# Patient Record
Sex: FEMALE | Race: BLACK OR AFRICAN AMERICAN | NOT HISPANIC OR LATINO | Employment: OTHER | URBAN - METROPOLITAN AREA
[De-identification: names, ages, dates, MRNs, and addresses within clinical notes are randomized per-mention and may not be internally consistent; named-entity substitution may affect disease eponyms.]

---

## 2019-09-19 ENCOUNTER — COMMUNICATION - HEALTHEAST (OUTPATIENT)
Dept: SCHEDULING | Facility: CLINIC | Age: 29
End: 2019-09-19

## 2019-10-02 ENCOUNTER — OFFICE VISIT - HEALTHEAST (OUTPATIENT)
Dept: FAMILY MEDICINE | Facility: CLINIC | Age: 29
End: 2019-10-02

## 2019-10-02 DIAGNOSIS — N92.6 MISSED PERIOD: ICD-10-CM

## 2019-10-02 DIAGNOSIS — Z87.19 HISTORY OF DIVERTICULITIS: ICD-10-CM

## 2019-10-02 LAB
HCG SERPL-ACNC: <2 MLU/ML (ref 0–4)
HCG UR QL: NEGATIVE

## 2019-10-02 ASSESSMENT — MIFFLIN-ST. JEOR: SCORE: 1560.24

## 2019-10-02 NOTE — ASSESSMENT & PLAN NOTE
Amenorrhea.  This patient has 4 successful pregnancies.  Negative qualitative hCG level in clinic today.  Interestingly, the same time of this missed.  She had an episode of diverticulitis.  Ultrasound and CT scans done in early September did not show any pelvic abnormalities.  She was successfully treated for diverticulitis with complete resolution of her abdominal symptoms.  - Quant hCG.  Question missed AB.  -Return to clinic in 2-4 weeks if she has not had a period.  At that time, expand differential to include other possible etiologies.

## 2019-10-02 NOTE — ASSESSMENT & PLAN NOTE
One lifetime episode.  Successful treatment with Augmentin.  She has never had a colonoscopy.  I have recommended a 6-week follow-up to evaluate for colon cancer given the unusual age and presentation of her diverticulitis flare.

## 2019-10-03 ENCOUNTER — COMMUNICATION - HEALTHEAST (OUTPATIENT)
Dept: FAMILY MEDICINE | Facility: CLINIC | Age: 29
End: 2019-10-03

## 2019-10-04 ENCOUNTER — COMMUNICATION - HEALTHEAST (OUTPATIENT)
Dept: FAMILY MEDICINE | Facility: CLINIC | Age: 29
End: 2019-10-04

## 2021-06-01 NOTE — PROGRESS NOTES
Assessment/Plan:    Missed period  Amenorrhea.  This patient has 4 successful pregnancies.  Negative qualitative hCG level in clinic today.  Interestingly, the same time of this missed.  She had an episode of diverticulitis.  Ultrasound and CT scans done in early September did not show any pelvic abnormalities.  She was successfully treated for diverticulitis with complete resolution of her abdominal symptoms.  - Quant hCG.  Question missed AB.  -Return to clinic in 2-4 weeks if she has not had a period.  At that time, expand differential to include other possible etiologies.    History of diverticulitis  One lifetime episode.  Successful treatment with Augmentin.  She has never had a colonoscopy.  I have recommended a 6-week follow-up to evaluate for colon cancer given the unusual age and presentation of her diverticulitis flare.     Return in about 2 weeks (around 10/16/2019).    Kurtis Rich MD  _______________________________    Chief Complaint   Patient presents with     Amenorrhea     Subjective: Nj Capellan is a 29 y.o. year old female who I have not seen in clinic before who presents with the following acute complaint(s):    Amenorrhea:   -This patient's recent health history includes an episode of diverticulitis.  This was treated with Augmentin approximately 1 month ago.  She also had bacterial vaginosis.  She was evaluated for urinary tract infection a proximal me 4 weeks ago which was negative.     - No pain today   - periods have always been very regular   - otherwise, she feels okay.     - appetite has been low.   - no vaginal discharge.   - BM: no history of constipation   - 4 children (youngest is 3 years old)    ROS: Complete review of systems obtained.  Pertinent items are listed above.     The following portions of the patient's history were reviewed and updated as appropriate: allergies, current medications, past family history, past medical history, past social history, past surgical  "history and problem list.     Objective:   /64 (Patient Site: Left Arm, Patient Position: Sitting, Cuff Size: Adult Large)   Pulse 68   Temp 97.8  F (36.6  C) (Oral)   Resp 18   Ht 5' 4.5\" (1.638 m)   Wt 189 lb (85.7 kg)   LMP 08/05/2019   SpO2 98% Comment: room air  Breastfeeding? No   BMI 31.94 kg/m    General: No acute distress  Cardiac: Regular rate and rhythm, normal S1/S2, no murmurs of the gallops  Respiratory: Clear to auscultation bilaterally.  Abdomen: Soft, nontender, nondistended.  : Not completed.    Recent Results (from the past 24 hour(s))   Pregnancy, Urine   Result Value Ref Range    Pregnancy Test, Urine Negative Negative     Us Pelvis With Transvaginal Non Ob    Result Date: 9/6/2019  EXAM: US PELVIS WITH TRANSVAGINAL NON OB LOCATION: [facility] DATE/TIME: 9/6/2019 10:56 AM INDICATION: left side pelvic pain COMPARISON: None. TECHNIQUE: Transabdominal scans were performed. Endovaginal ultrasound was performed to better visualize the adnexa. FINDINGS: Uterus measures 5.4 x 4.9 x 3.2  cm. Normal uterus with no masses. Endometrial thickness is 4  mm. Normal smooth endometrium.  Right ovary measures 2.7 x 1.8 x 1.7  cm. Normal right ovary. Normal arterial duplex. Left ovary measures 3.0 x 2.4 x 1.5  cm. Normal left ovary. Normal arterial duplex. There is trace free fluid in the cul-de-sac which may be physiologic.     CONCLUSION: 1.  Normal ultrasound of the pelvis.    Ct Abdomen Pelvis Without Oral With Iv Contrast    Result Date: 9/6/2019  EXAM: CT ABDOMEN PELVIS WO ORAL W IV CONTRAST LOCATION: [facility] DATE/TIME: 9/6/2019 1:54 PM INDICATION: LLQ abdominal pain, diverticulitis suspected ABD PAIN COMPARISON: None. TECHNIQUE: Helical enhanced thin-section CT scan of the abdomen and pelvis was performed following injection of IV contrast. Multiplanar reformats were obtained. Dose reduction techniques were used. CONTRAST: Iohexol (Omni) 100 mL FINDINGS: LUNG BASES: " Negative. ABDOMEN: Liver, gallbladder, bile ducts, pancreas, spleen, adrenals and kidneys appear normal. PELVIS: Focal colonic wall thickening and pericolonic soft tissue stranding involving sigmoid colon. Several diverticula are present at the same region and the findings are consistent with uncomplicated diverticulitis. There is no abscess, free perforation or bowel obstruction. No adnexal lesions or free fluid. Appendix normal. MUSCULOSKELETAL: Negative.     CONCLUSION: 1.  Uncomplicated sigmoid diverticulitis.       Additional History from Old Records Summarized (2): yes  Decision to Obtain Records (1): no  Radiology Tests Summarized or Ordered (1): yes  Labs Reviewed or Ordered (1): yes  Medicine Test Summarized or Ordered (1): no  Independent Review of EKG or X-RAY(2 each): no    This note has been dictated using voice recognition software. Any grammatical or context distortions are unintentional and inherent to the software

## 2021-06-01 NOTE — TELEPHONE ENCOUNTER
Seen in  ER 2 weeks ago for abdominal pain. Given antibiotic x 10 days. Finished. The abdominal pain is gone. She states she still has not gotten her period. For the last 3 days she has had vaginal itching and discharge. LMP 8/10/2019.    Reason for Disposition    Menstrual period, missed or late    [1] Symptoms of a yeast infection (i.e., itchy, white discharge, not bad smelling) AND    [2] not improved > 3 days following CARE ADVICE    Protocols used: MENSTRUAL PERIOD - MISSED OR LATE-A-AH, VAGINAL SYMPTOMS-A-AH    (treated with antibiotic for diverticulitis).   Triaged to a disposition of see PCP when office is open-within 3 days. She will call her clinic in the am.     Lata Ramesh RN [facility] Triage Nurse

## 2021-06-02 NOTE — TELEPHONE ENCOUNTER
Patient Returning Call  Reason for call:  Test Results  Information relayed to patient:  Yes, patient said it is ok.   Patient has additional questions:  No  If YES, what are your questions/concerns:    Okay to leave a detailed message?: No call back needed

## 2021-06-02 NOTE — TELEPHONE ENCOUNTER
Test Results  Who is calling?:  Patient   Who ordered the test:  Kurtis Mills   Type of test: Lab  Date of test:  10/02/19  Where was the test performed:  [facility]   What are your questions/concerns?:  Patient calling In for results writer did relayed message from Dr.St Hubbard below. Patient have questions about why she did not not get her menstrual periods for two months she is requesting provider if he can recommend ultra sound .   Okay to leave a detailed message?:  No    Blood testing for pregnancy was negative.  Please call with questions or contact us using [employer].  Sincerely,    Electronically signed by Kurtis Rich MD

## 2021-06-02 NOTE — TELEPHONE ENCOUNTER
Left message to call back for: update  Information to relay to patient: upon return call please see questions below and update

## 2021-06-03 VITALS
DIASTOLIC BLOOD PRESSURE: 64 MMHG | HEART RATE: 68 BPM | BODY MASS INDEX: 31.49 KG/M2 | TEMPERATURE: 97.8 F | RESPIRATION RATE: 18 BRPM | OXYGEN SATURATION: 98 % | SYSTOLIC BLOOD PRESSURE: 120 MMHG | WEIGHT: 189 LBS | HEIGHT: 65 IN

## 2021-06-16 PROBLEM — Z87.19 HISTORY OF DIVERTICULITIS: Status: ACTIVE | Noted: 2019-10-02

## 2021-06-16 PROBLEM — N92.6 MISSED PERIOD: Status: ACTIVE | Noted: 2019-10-02

## 2021-06-19 NOTE — LETTER
Letter by Kurtis Rich MD at      Author: Kurtis Rich MD Service: -- Author Type: --    Filed:  Encounter Date: 10/3/2019 Status: Signed         Nj Capellan  [address] [facility] [address]             October 3, 2019         Dear Ms. Capelaln,    Below are the results from your recent visit:    Resulted Orders   Pregnancy, Urine   Result Value Ref Range    Pregnancy Test, Urine Negative Negative    Narrative    This test is for screening purposes. Results should be interpreted along with the clinical picture. Confirmation testing is available if warranted by ordering Test 143, Beta HCG, Quantitative.   Beta-hCG, Quantitative   Result Value Ref Range    Beta-hCG, Quantitative <2 0 - 4 mlU/mL    Narrative    Non-pregnant female . . . . . . . . . . . . . 0-4 (<5) mIU/mL  Equivocal result for early pregnancy . . . . 5-24 mIU/mL  Pregnant Female:  -------------------------------------------------------------  Weeks Post LMP Approximate HCG range(mIU/mL)  (Last Menstrual Period)range  -------------------------------------------------------------  3-4 Weeks . . . . . . . 9- 130 mIU/mL  4-5 Weeks . . . . . . . 75- 2,600 mIU/mL  5-6 Weeks . . . . . . 850- 20,800 mIU/mL  6-7 Weeks . . . . . 4,000-100,200 mIU/mL  7-12 Weeks . . . . . 11,500-289,000 mIU/mL  12-16 Weeks . . . . . 18,300-137,000 mIU/mL  16-29 Weeks. . . . . . 1,400- 53,000 mIU/mL  (2nd Trimester)    29-41 Weeks. . . . . . . 940- 60,000 mIU/mL    (3rd Trimester)  INTERPRETIVE NOTE:  For diagnostic purposes, hCG results should be used  conjunction with other data.  If the hCG level is inconsistent with clinical evidence,  results should be confirmed by an alternate hCG method.  This assay is approved for use in the early detection  of pregnancy only. It is not approved for any other  uses such as tumor marker screening or monitoring.  Beta HCG ranges were updated 2/2007 to reflect  methodology referencing to the 4th World Health  Organization  (WHO) International Standard.       Blood testing for pregnancy was negative.    Please call with questions or contact us using [employer].    Sincerely,        Electronically signed by Kurtis Rich MD

## 2021-07-19 ENCOUNTER — TRANSCRIBE ORDERS (OUTPATIENT)
Dept: MATERNAL FETAL MEDICINE | Facility: HOSPITAL | Age: 31
End: 2021-07-19

## 2021-07-19 ENCOUNTER — MEDICAL CORRESPONDENCE (OUTPATIENT)
Dept: HEALTH INFORMATION MANAGEMENT | Facility: CLINIC | Age: 31
End: 2021-07-19

## 2021-07-19 ENCOUNTER — TRANSFERRED RECORDS (OUTPATIENT)
Dept: HEALTH INFORMATION MANAGEMENT | Facility: CLINIC | Age: 31
End: 2021-07-19

## 2021-07-19 DIAGNOSIS — O26.90 PREGNANCY RELATED CONDITION, ANTEPARTUM: Primary | ICD-10-CM

## 2021-07-20 LAB
HEPATITIS B SURFACE ANTIGEN (EXTERNAL): POSITIVE
HEPATITIS C ANTIBODY (EXTERNAL): NONREACTIVE

## 2021-08-02 DIAGNOSIS — B18.1 HEPATITIS B CARRIER (H): Primary | ICD-10-CM

## 2021-08-24 ENCOUNTER — TELEPHONE (OUTPATIENT)
Dept: MATERNAL FETAL MEDICINE | Facility: CLINIC | Age: 31
End: 2021-08-24

## 2021-08-24 NOTE — TELEPHONE ENCOUNTER
Pt's listed number was out of service and multiple attempts were made to contact pt to make appt. I called the primary OB clinic and got another number to try, which is now updated in her demographics and listed as the primary contact number. I tried calling this new number twice, both times she answered but then hung up when I asked to speak with Nj. Unable to leave a message or get a hold of pt as of 8/24 since pt won't answer or let it go to voicemail. Primary clinic is aware that [facility] isn't able to reach pt at this new number, they will try calling the pt.    Aleshia Black   , [facility]  8/24/21

## 2021-08-26 ENCOUNTER — PRE VISIT (OUTPATIENT)
Dept: MATERNAL FETAL MEDICINE | Facility: CLINIC | Age: 31
End: 2021-08-26

## 2021-08-27 ENCOUNTER — OFFICE VISIT (OUTPATIENT)
Dept: MATERNAL FETAL MEDICINE | Facility: CLINIC | Age: 31
End: 2021-08-27
Attending: OBSTETRICS & GYNECOLOGY
Payer: COMMERCIAL

## 2021-08-27 ENCOUNTER — HOSPITAL ENCOUNTER (OUTPATIENT)
Dept: ULTRASOUND IMAGING | Facility: CLINIC | Age: 31
End: 2021-08-27
Attending: OBSTETRICS & GYNECOLOGY
Payer: COMMERCIAL

## 2021-08-27 DIAGNOSIS — O26.90 PREGNANCY RELATED CONDITION, ANTEPARTUM: ICD-10-CM

## 2021-08-27 DIAGNOSIS — B18.1 HEPATITIS B CARRIER (H): ICD-10-CM

## 2021-08-27 DIAGNOSIS — B19.10 HEPATITIS B COMPLICATING PREGNANCY: Primary | ICD-10-CM

## 2021-08-27 DIAGNOSIS — O98.419 HEPATITIS B COMPLICATING PREGNANCY: Primary | ICD-10-CM

## 2021-08-27 PROCEDURE — 76811 OB US DETAILED SNGL FETUS: CPT

## 2021-08-27 PROCEDURE — 76811 OB US DETAILED SNGL FETUS: CPT | Mod: 26 | Performed by: OBSTETRICS & GYNECOLOGY

## 2021-08-27 PROCEDURE — 99242 OFF/OP CONSLTJ NEW/EST SF 20: CPT | Mod: 25 | Performed by: OBSTETRICS & GYNECOLOGY

## 2021-08-27 NOTE — NURSING NOTE
Nj here for L2 US and MFM consult for pregnancy c/b Hep B carrier and obesity. Meds/allergies reviewed. Dr. Douglass reviewed US and met with pt (see consult note for recommendations).   Kayla Estrada RN

## 2021-08-30 NOTE — PROGRESS NOTES
"Please see \"Imaging\" tab under \"Chart Review\" for details of today's US.    Tonya Douglass, DO    "

## 2021-09-14 LAB
HAV AB SER QL IA: REACTIVE
HEMOGLOBIN (EXTERNAL): 12.9 G/DL (ref 11.7–15.5)
PLATELET COUNT (EXTERNAL): 229 10E3/UL (ref 140–400)
TREPONEMA PALLIDUM ANTIBODY (EXTERNAL): NONREACTIVE

## 2021-10-20 LAB — HIV1+2 AB SERPL QL IA: NONREACTIVE

## 2021-10-30 ENCOUNTER — HOSPITAL ENCOUNTER (OUTPATIENT)
Facility: CLINIC | Age: 31
End: 2021-10-30
Admitting: ADVANCED PRACTICE MIDWIFE
Payer: COMMERCIAL

## 2021-10-30 ENCOUNTER — HOSPITAL ENCOUNTER (EMERGENCY)
Facility: CLINIC | Age: 31
Discharge: HOME | End: 2021-10-30
Attending: EMERGENCY MEDICINE | Admitting: EMERGENCY MEDICINE
Payer: COMMERCIAL

## 2021-10-30 ENCOUNTER — HOSPITAL ENCOUNTER (OUTPATIENT)
Facility: CLINIC | Age: 31
Discharge: HOME | End: 2021-10-30
Attending: ADVANCED PRACTICE MIDWIFE | Admitting: ADVANCED PRACTICE MIDWIFE
Payer: COMMERCIAL

## 2021-10-30 VITALS
WEIGHT: 208.78 LBS | OXYGEN SATURATION: 97 % | HEART RATE: 94 BPM | RESPIRATION RATE: 24 BRPM | DIASTOLIC BLOOD PRESSURE: 60 MMHG | SYSTOLIC BLOOD PRESSURE: 115 MMHG | BODY MASS INDEX: 35.28 KG/M2

## 2021-10-30 VITALS — HEIGHT: 68 IN | BODY MASS INDEX: 31.37 KG/M2 | WEIGHT: 207 LBS
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
## 2021-10-30 DIAGNOSIS — S70.01XA CONTUSION OF RIGHT HIP, INITIAL ENCOUNTER: ICD-10-CM

## 2021-10-30 PROBLEM — Z36.89 ENCOUNTER FOR TRIAGE IN PREGNANT PATIENT: Status: ACTIVE | Noted: 2021-10-30

## 2021-10-30 LAB
ATRIAL RATE - MUSE: 102 BPM
DIASTOLIC BLOOD PRESSURE - MUSE: NORMAL MMHG
INTERPRETATION ECG - MUSE: NORMAL
P AXIS - MUSE: 52 DEGREES
PR INTERVAL - MUSE: 116 MS
QRS DURATION - MUSE: 70 MS
QT - MUSE: 338 MS
QTC - MUSE: 440 MS
R AXIS - MUSE: -18 DEGREES
SYSTOLIC BLOOD PRESSURE - MUSE: NORMAL MMHG
T AXIS - MUSE: 29 DEGREES
VENTRICULAR RATE- MUSE: 102 BPM

## 2021-10-30 PROCEDURE — G0463 HOSPITAL OUTPT CLINIC VISIT: HCPCS

## 2021-10-30 PROCEDURE — 99283 EMERGENCY DEPT VISIT LOW MDM: CPT

## 2021-10-30 PROCEDURE — 93005 ELECTROCARDIOGRAM TRACING: CPT | Performed by: EMERGENCY MEDICINE

## 2021-10-30 RX ORDER — LIDOCAINE 40 MG/G
CREAM TOPICAL
Status: DISCONTINUED | OUTPATIENT
Start: 2021-10-30 | End: 2021-10-30 | Stop reason: HOSPADM

## 2021-10-30 ASSESSMENT — MIFFLIN-ST. JEOR: SCORE: 1697.45

## 2021-10-30 NOTE — DISCHARGE INSTRUCTIONS
Discharge Instruction for Undelivered Patients      You were seen for: Fetal Assessment  We Consulted: [facility]  You had (Test or Medicine):Non Stress Test     Diet:   Drink 8 to 12 glasses of liquids (milk, juice, water) every day.  You may eat meals and snacks.     Activity:  Count fetal kicks everyday (see handout)  Call your doctor or nurse midwife if your baby is moving less than usual.     Call your provider if you notice:  Swelling in your face or increased swelling in your hands or legs.  Headaches that are not relieved by Tylenol (acetaminophen).  Changes in your vision (blurring: seeing spots or stars.)  Nausea (sick to your stomach) and vomiting (throwing up).   Weight gain of 5 pounds or more per week.  Heartburn that doesn't go away.  Signs of bladder infection: pain when you urinate (use the toilet), need to go more often and more urgently.  The bag of vides (rupture of membranes) breaks, or you notice leaking in your underwear.  Bright red blood in your underwear.  Abdominal (lower belly) or stomach pain.  For first baby: Contractions (tightening) less than 5 minutes apart for one hour or more.  Second (plus) baby: Contractions (tightening) less than 10 minutes apart and getting stronger.  *If less than 34 weeks: Contractions (tightening) more than 6 times in one hour.  Increase or change in vaginal discharge (note the color and amount)      Follow-up:  As scheduled in the clinic

## 2021-10-30 NOTE — ED PROVIDER NOTES
EMERGENCY DEPARTMENT ENCOUnter      NAME: Nj Capellan  AGE: 31 year old female  YOB: 1990  MRN: [id number]  EVALUATION DATE & TIME: 10/30/2021 12:07 PM    PCP: Norma Orantes    ED PROVIDER: Malick Sandoval DO      Chief Complaint   Patient presents with     Fall     Hip Pain     Tachycardia         FINAL IMPRESSION:  1. Fall, initial encounter    2. Contusion of right hip, initial encounter          ED COURSE & MEDICAL DECISION MAKIN:49 PM I met with the patient to gather history and to perform my initial exam. I discussed the plan for care while in the Emergency Department. PPE (gown, gloves, glasses, surgical cap, N95 mask, surgical mask, and face shield) was worn during patient encounters.       The patient presented to the emergency department today after slipping and falling at the store.  She fell onto her right hip.  She is currently 8 months pregnant.  She denies any specific pain and has no physical exam findings to suggest a serious injury.  We discussed performing imaging of her right hip but she would like to hold off on this at this time.  She did not strike her head and I do not feel that any other imaging would be warranted.  The case was discussed with maternity.  They recommend transferring her upstairs so that they can monitor her further given her late stage pregnancy.  She is comfortable with this plan.    At the conclusion of the encounter I discussed the results of all of the tests and the disposition. The questions were answered. The patient or family acknowledged understanding and was agreeable with the care plan.            =================================================================    HPI    Patient information was obtained from: the patient      Use of Interpretor: N/A      Nj Capellan is a 31 year old female with a pertinent history of diverticulitis and missed period who presents to this ED via walk-in unaccompanied for evaluation of a fall, hip, and  tachycardia.     The patient who is 8 months and 1 week pregnant reports slipping and falling on a wet surface while shopping at [employer] yesterday (10/29). She did not hit her head, but is now experiencing intermittent right hip pain as well as intermittent tachycardia. In addition, she mentioned that she can feel her baby moving more than before. The patient denies any other symptoms or complaints at this time.        REVIEW OF SYSTEMS     Constitutional:  Denies fever or chills  HENT:  Denies sore throat   Respiratory:  Denies cough or shortness of breath   Cardiovascular:  Denies chest pain or palpitations  GI:  Denies abdominal pain, nausea, or vomiting  Musculoskeletal:  Positive for right hip pain.   Skin:  Denies rash   Neurologic:  Denies headache, focal weakness or sensory changes    All other systems reviewed and are negative      PAST MEDICAL HISTORY:  History reviewed. No pertinent past medical history.    PAST SURGICAL HISTORY:  Past Surgical History:   Procedure Laterality Date      SECTION             CURRENT MEDICATIONS:    Prenatal MV-Min-Fe Fum-FA-DHA (PRENATAL 1 PO)        ALLERGIES:  No Known Allergies    FAMILY HISTORY:  Family History   Problem Relation Age of Onset     No Known Problems Daughter        SOCIAL HISTORY:   Social History     Socioeconomic History     Marital status:      Spouse name: None     Number of children: None     Years of education: None     Highest education level: None   Occupational History     None   Tobacco Use     Smoking status: Never Smoker     Smokeless tobacco: Never Used   Substance and Sexual Activity     Alcohol use: Never     Drug use: Never     Sexual activity: None   Other Topics Concern     None   Social History Narrative     None     Social Determinants of Health     Financial Resource Strain:      Difficulty of Paying Living Expenses:    Food Insecurity:      Worried About Running Out of Food in the Last Year:      Ran Out of Food in  the Last Year:    Transportation Needs:      Lack of Transportation (Medical):      Lack of Transportation (Non-Medical):    Physical Activity:      Days of Exercise per Week:      Minutes of Exercise per Session:    Stress:      Feeling of Stress :    Social Connections:      Frequency of Communication with Friends and Family:      Frequency of Social Gatherings with Friends and Family:      Attends Rastafari Services:      Active Member of Clubs or Organizations:      Attends Club or Organization Meetings:      Marital Status:    Intimate Partner Violence:      Fear of Current or Ex-Partner:      Emotionally Abused:      Physically Abused:      Sexually Abused:        VITALS:  Patient Vitals for the past 24 hrs:   BP Pulse Resp SpO2 Weight   10/30/21 1300 115/60 94 24 97 % --   10/30/21 1152 -- -- -- -- 94.7 kg (208 lb 12.4 oz)       PHYSICAL EXAM    Constitutional:  Well developed, Well nourished,  HENT:  Normocephalic, Atraumatic, Bilateral external ears normal, Oropharynx moist, Nose normal.   Neck:  Normal range of motion, No meningismus, No stridor.   Eyes:  EOMI, Conjunctiva normal, No discharge.   Respiratory:  Normal breath sounds, No respiratory distress, No wheezing, No chest tenderness.   Cardiovascular:  Normal heart rate, Normal rhythm, No murmurs  GI:  Soft, No tenderness, No guarding, No CVA tenderness.   Musculoskeletal:  Neurovascularly intact distally, No edema, No tenderness, No cyanosis, Good range of motion in all major joints. No tenderness to palpation or major deformities noted.   Integument:  Warm, Dry, No erythema, No rash.   Lymphatic:  No lymphadenopathy noted.   Neurologic:  Alert & oriented x 3, Normal motor function, Normal sensory function, No focal deficits noted.   Psychiatric:  Affect normal, Judgment normal, Mood normal.      LAB:  All pertinent labs reviewed and interpreted.  Results for orders placed or performed during the hospital encounter of 10/30/21   ECG 12-LEAD WITH  MUSE (LHE)   Result Value Ref Range    Systolic Blood Pressure  mmHg    Diastolic Blood Pressure  mmHg    Ventricular Rate 102 BPM    Atrial Rate 102 BPM    IA Interval 116 ms    QRS Duration 70 ms     ms    QTc 440 ms    P Axis 52 degrees    R AXIS -18 degrees    T Axis 29 degrees    Interpretation ECG       Sinus tachycardia  Minimal voltage criteria for LVH, may be normal variant  Borderline ECG  No previous ECGs available  Confirmed by SEE ED PROVIDER NOTE FOR, ECG INTERPRETATION ([id number]),  MIRANDA CAPONE ([id number]) on 10/30/2021 12:21:22 PM            EKG:    Sinus tachycardia at 102 bpm.  Nonspecific ST changes.  No signs of acute ischemia.  QRS 70 ms,  ms.    I have independently reviewed and interpreted this EKG          I, Robert Cooper, am serving as a scribe to document services personally performed by Dr. Sandoval based on my observation and the provider's statements to me. I, Mlaick Sandoval, DO attest that Robert Cooper is acting in a scribe capacity, has observed my performance of the services and has documented them in accordance with my direction.    Malick Sandoval, DO  Emergency Medicine  [facility] EMERGENCY ROOM  [address] [id number] [phone]  Dept: [phone]       Malick Sandoval MD  10/30/21 [id number]

## 2021-10-30 NOTE — ED TRIAGE NOTES
Pt who is 8 months and one week pregnant is here because she slipped on a wet surface at [employer] store yesterday. Is having right hip pain and feels her heart racing intermittently. Denies loss of consciousness or hitting her head. Feels the baby moving a lot. Hernandes report from the store.

## 2021-10-30 NOTE — PROGRESS NOTES
Patient presented from emergency room for fetal monitoring.  Monitors applied and NST obtained.  Patient states +FM, denies AROM or vaginal bleeding.  NST had category 1 tracing.  Cat Grossman CNM came to bedside and OK'd discharge to home with follow up in clinic as scheduled on Tuesday.    Patient verbalized understanding of home instructions and follow up plans.

## 2021-10-30 NOTE — DISCHARGE INSTRUCTIONS
You do not appear to have suffered any serious injuries from your fall yesterday.  Follow-up closely with your primary care doctor as an outpatient and return to the ER if you have any worsening symptoms or other concerns.

## 2021-11-03 LAB — GROUP B STREPTOCOCCUS (EXTERNAL): NEGATIVE

## 2021-11-30 DIAGNOSIS — Z3A.41 41 WEEKS GESTATION OF PREGNANCY: Primary | ICD-10-CM

## 2021-11-30 DIAGNOSIS — O48.0 41 WEEKS GESTATION OF PREGNANCY: Primary | ICD-10-CM

## 2021-12-01 ENCOUNTER — HOSPITAL ENCOUNTER (OUTPATIENT)
Facility: CLINIC | Age: 31
End: 2021-12-01
Payer: COMMERCIAL

## 2021-12-03 ENCOUNTER — HOSPITAL ENCOUNTER (INPATIENT)
Facility: CLINIC | Age: 31
LOS: 2 days | Discharge: HOME | End: 2021-12-05
Attending: ADVANCED PRACTICE MIDWIFE | Admitting: ADVANCED PRACTICE MIDWIFE
Payer: COMMERCIAL

## 2021-12-03 ENCOUNTER — LAB (OUTPATIENT)
Dept: LAB | Facility: CLINIC | Age: 31
End: 2021-12-03
Attending: STUDENT IN AN ORGANIZED HEALTH CARE EDUCATION/TRAINING PROGRAM
Payer: COMMERCIAL

## 2021-12-03 DIAGNOSIS — O34.219 VBAC, DELIVERED: ICD-10-CM

## 2021-12-03 DIAGNOSIS — O48.0 41 WEEKS GESTATION OF PREGNANCY: ICD-10-CM

## 2021-12-03 DIAGNOSIS — Z3A.41 41 WEEKS GESTATION OF PREGNANCY: ICD-10-CM

## 2021-12-03 PROBLEM — Z34.90 ENCOUNTER FOR INDUCTION OF LABOR: Status: ACTIVE | Noted: 2021-12-03

## 2021-12-03 LAB
ABO/RH(D): NORMAL
ANTIBODY SCREEN: NEGATIVE
BASOPHILS # BLD AUTO: 0 10E3/UL (ref 0–0.2)
BASOPHILS NFR BLD AUTO: 0 %
EOSINOPHIL # BLD AUTO: 0.1 10E3/UL (ref 0–0.7)
EOSINOPHIL NFR BLD AUTO: 1 %
ERYTHROCYTE [DISTWIDTH] IN BLOOD BY AUTOMATED COUNT: 15.3 % (ref 10–15)
HCT VFR BLD AUTO: 38.1 % (ref 35–47)
HGB BLD-MCNC: 12.4 G/DL (ref 11.7–15.7)
IMM GRANULOCYTES # BLD: 0 10E3/UL
IMM GRANULOCYTES NFR BLD: 0 %
LYMPHOCYTES # BLD AUTO: 1.7 10E3/UL (ref 0.8–5.3)
LYMPHOCYTES NFR BLD AUTO: 21 %
MCH RBC QN AUTO: 27.8 PG (ref 26.5–33)
MCHC RBC AUTO-ENTMCNC: 32.5 G/DL (ref 31.5–36.5)
MCV RBC AUTO: 85 FL (ref 78–100)
MONOCYTES # BLD AUTO: 0.7 10E3/UL (ref 0–1.3)
MONOCYTES NFR BLD AUTO: 9 %
NEUTROPHILS # BLD AUTO: 5.6 10E3/UL (ref 1.6–8.3)
NEUTROPHILS NFR BLD AUTO: 69 %
NRBC # BLD AUTO: 0 10E3/UL
NRBC BLD AUTO-RTO: 0 /100
PLATELET # BLD AUTO: 217 10E3/UL (ref 150–450)
RBC # BLD AUTO: 4.46 10E6/UL (ref 3.8–5.2)
SARS-COV-2 RNA RESP QL NAA+PROBE: NEGATIVE
SPECIMEN EXPIRATION DATE: NORMAL
WBC # BLD AUTO: 8.1 10E3/UL (ref 4–11)

## 2021-12-03 PROCEDURE — U0005 INFEC AGEN DETEC AMPLI PROBE: HCPCS

## 2021-12-03 PROCEDURE — 87635 SARS-COV-2 COVID-19 AMP PRB: CPT | Performed by: ADVANCED PRACTICE MIDWIFE

## 2021-12-03 PROCEDURE — 3E033VJ INTRODUCTION OF OTHER HORMONE INTO PERIPHERAL VEIN, PERCUTANEOUS APPROACH: ICD-10-PCS | Performed by: ADVANCED PRACTICE MIDWIFE

## 2021-12-03 PROCEDURE — 86900 BLOOD TYPING SEROLOGIC ABO: CPT | Performed by: ADVANCED PRACTICE MIDWIFE

## 2021-12-03 PROCEDURE — 120N000001 HC R&B MED SURG/OB

## 2021-12-03 PROCEDURE — 258N000003 HC RX IP 258 OP 636: Performed by: ADVANCED PRACTICE MIDWIFE

## 2021-12-03 PROCEDURE — 36415 COLL VENOUS BLD VENIPUNCTURE: CPT | Performed by: ADVANCED PRACTICE MIDWIFE

## 2021-12-03 PROCEDURE — 86780 TREPONEMA PALLIDUM: CPT | Performed by: ADVANCED PRACTICE MIDWIFE

## 2021-12-03 PROCEDURE — 250N000009 HC RX 250: Performed by: ADVANCED PRACTICE MIDWIFE

## 2021-12-03 PROCEDURE — U0003 INFECTIOUS AGENT DETECTION BY NUCLEIC ACID (DNA OR RNA); SEVERE ACUTE RESPIRATORY SYNDROME CORONAVIRUS 2 (SARS-COV-2) (CORONAVIRUS DISEASE [COVID-19]), AMPLIFIED PROBE TECHNIQUE, MAKING USE OF HIGH THROUGHPUT TECHNOLOGIES AS DESCRIBED BY CMS-2020-01-R: HCPCS

## 2021-12-03 PROCEDURE — 85025 COMPLETE CBC W/AUTO DIFF WBC: CPT | Performed by: ADVANCED PRACTICE MIDWIFE

## 2021-12-03 RX ORDER — LIDOCAINE 40 MG/G
CREAM TOPICAL
Status: DISCONTINUED | OUTPATIENT
Start: 2021-12-03 | End: 2021-12-04 | Stop reason: HOSPADM

## 2021-12-03 RX ORDER — KETOROLAC TROMETHAMINE 30 MG/ML
30 INJECTION, SOLUTION INTRAMUSCULAR; INTRAVENOUS
Status: DISCONTINUED | OUTPATIENT
Start: 2021-12-03 | End: 2021-12-05 | Stop reason: HOSPADM

## 2021-12-03 RX ORDER — SODIUM CHLORIDE, SODIUM LACTATE, POTASSIUM CHLORIDE, CALCIUM CHLORIDE 600; 310; 30; 20 MG/100ML; MG/100ML; MG/100ML; MG/100ML
INJECTION, SOLUTION INTRAVENOUS CONTINUOUS PRN
Status: DISCONTINUED | OUTPATIENT
Start: 2021-12-03 | End: 2021-12-04 | Stop reason: HOSPADM

## 2021-12-03 RX ORDER — METOCLOPRAMIDE HYDROCHLORIDE 5 MG/ML
10 INJECTION INTRAMUSCULAR; INTRAVENOUS EVERY 6 HOURS PRN
Status: DISCONTINUED | OUTPATIENT
Start: 2021-12-03 | End: 2021-12-04 | Stop reason: HOSPADM

## 2021-12-03 RX ORDER — PROCHLORPERAZINE MALEATE 10 MG
10 TABLET ORAL EVERY 6 HOURS PRN
Status: DISCONTINUED | OUTPATIENT
Start: 2021-12-03 | End: 2021-12-04 | Stop reason: HOSPADM

## 2021-12-03 RX ORDER — LIDOCAINE 40 MG/G
CREAM TOPICAL
Status: DISCONTINUED | OUTPATIENT
Start: 2021-12-03 | End: 2021-12-03 | Stop reason: HOSPADM

## 2021-12-03 RX ORDER — OXYTOCIN 10 [USP'U]/ML
10 INJECTION, SOLUTION INTRAMUSCULAR; INTRAVENOUS
Status: DISCONTINUED | OUTPATIENT
Start: 2021-12-03 | End: 2021-12-05 | Stop reason: HOSPADM

## 2021-12-03 RX ORDER — VITAMIN B COMPLEX
1 TABLET ORAL DAILY
COMMUNITY
End: 2023-09-21

## 2021-12-03 RX ORDER — NALOXONE HYDROCHLORIDE 0.4 MG/ML
0.2 INJECTION, SOLUTION INTRAMUSCULAR; INTRAVENOUS; SUBCUTANEOUS
Status: DISCONTINUED | OUTPATIENT
Start: 2021-12-03 | End: 2021-12-04 | Stop reason: HOSPADM

## 2021-12-03 RX ORDER — FENTANYL CITRATE 50 UG/ML
50-100 INJECTION, SOLUTION INTRAMUSCULAR; INTRAVENOUS
Status: DISCONTINUED | OUTPATIENT
Start: 2021-12-03 | End: 2021-12-04 | Stop reason: HOSPADM

## 2021-12-03 RX ORDER — CARBOPROST TROMETHAMINE 250 UG/ML
250 INJECTION, SOLUTION INTRAMUSCULAR
Status: DISCONTINUED | OUTPATIENT
Start: 2021-12-03 | End: 2021-12-04 | Stop reason: HOSPADM

## 2021-12-03 RX ORDER — ONDANSETRON 2 MG/ML
4 INJECTION INTRAMUSCULAR; INTRAVENOUS EVERY 6 HOURS PRN
Status: DISCONTINUED | OUTPATIENT
Start: 2021-12-03 | End: 2021-12-04 | Stop reason: HOSPADM

## 2021-12-03 RX ORDER — OXYTOCIN/0.9 % SODIUM CHLORIDE 30/500 ML
340 PLASTIC BAG, INJECTION (ML) INTRAVENOUS CONTINUOUS PRN
Status: DISCONTINUED | OUTPATIENT
Start: 2021-12-03 | End: 2021-12-04 | Stop reason: HOSPADM

## 2021-12-03 RX ORDER — METOCLOPRAMIDE 10 MG/1
10 TABLET ORAL EVERY 6 HOURS PRN
Status: DISCONTINUED | OUTPATIENT
Start: 2021-12-03 | End: 2021-12-04 | Stop reason: HOSPADM

## 2021-12-03 RX ORDER — MISOPROSTOL 200 UG/1
400 TABLET ORAL
Status: DISCONTINUED | OUTPATIENT
Start: 2021-12-03 | End: 2021-12-04 | Stop reason: HOSPADM

## 2021-12-03 RX ORDER — TERBUTALINE SULFATE 1 MG/ML
0.25 INJECTION, SOLUTION SUBCUTANEOUS
Status: DISCONTINUED | OUTPATIENT
Start: 2021-12-03 | End: 2021-12-04 | Stop reason: HOSPADM

## 2021-12-03 RX ORDER — ONDANSETRON 4 MG/1
4 TABLET, ORALLY DISINTEGRATING ORAL EVERY 6 HOURS PRN
Status: DISCONTINUED | OUTPATIENT
Start: 2021-12-03 | End: 2021-12-04 | Stop reason: HOSPADM

## 2021-12-03 RX ORDER — OXYTOCIN/0.9 % SODIUM CHLORIDE 30/500 ML
1-24 PLASTIC BAG, INJECTION (ML) INTRAVENOUS CONTINUOUS
Status: DISCONTINUED | OUTPATIENT
Start: 2021-12-03 | End: 2021-12-04 | Stop reason: HOSPADM

## 2021-12-03 RX ORDER — OXYTOCIN 10 [USP'U]/ML
10 INJECTION, SOLUTION INTRAMUSCULAR; INTRAVENOUS
Status: DISCONTINUED | OUTPATIENT
Start: 2021-12-03 | End: 2021-12-04 | Stop reason: HOSPADM

## 2021-12-03 RX ORDER — SODIUM CHLORIDE, SODIUM LACTATE, POTASSIUM CHLORIDE, CALCIUM CHLORIDE 600; 310; 30; 20 MG/100ML; MG/100ML; MG/100ML; MG/100ML
INJECTION, SOLUTION INTRAVENOUS CONTINUOUS
Status: DISCONTINUED | OUTPATIENT
Start: 2021-12-03 | End: 2021-12-04 | Stop reason: HOSPADM

## 2021-12-03 RX ORDER — PROCHLORPERAZINE 25 MG
25 SUPPOSITORY, RECTAL RECTAL EVERY 12 HOURS PRN
Status: DISCONTINUED | OUTPATIENT
Start: 2021-12-03 | End: 2021-12-04 | Stop reason: HOSPADM

## 2021-12-03 RX ORDER — IBUPROFEN 600 MG/1
600 TABLET, FILM COATED ORAL
Status: DISCONTINUED | OUTPATIENT
Start: 2021-12-03 | End: 2021-12-05 | Stop reason: HOSPADM

## 2021-12-03 RX ORDER — OXYCODONE HYDROCHLORIDE 5 MG/1
5 TABLET ORAL
Status: DISCONTINUED | OUTPATIENT
Start: 2021-12-03 | End: 2021-12-05 | Stop reason: HOSPADM

## 2021-12-03 RX ORDER — NALOXONE HYDROCHLORIDE 0.4 MG/ML
0.4 INJECTION, SOLUTION INTRAMUSCULAR; INTRAVENOUS; SUBCUTANEOUS
Status: DISCONTINUED | OUTPATIENT
Start: 2021-12-03 | End: 2021-12-04 | Stop reason: HOSPADM

## 2021-12-03 RX ORDER — METHYLERGONOVINE MALEATE 0.2 MG/ML
200 INJECTION INTRAVENOUS
Status: DISCONTINUED | OUTPATIENT
Start: 2021-12-03 | End: 2021-12-04 | Stop reason: HOSPADM

## 2021-12-03 RX ORDER — MISOPROSTOL 200 UG/1
800 TABLET ORAL
Status: DISCONTINUED | OUTPATIENT
Start: 2021-12-03 | End: 2021-12-04 | Stop reason: HOSPADM

## 2021-12-03 RX ORDER — OXYTOCIN/0.9 % SODIUM CHLORIDE 30/500 ML
100-340 PLASTIC BAG, INJECTION (ML) INTRAVENOUS CONTINUOUS PRN
Status: DISCONTINUED | OUTPATIENT
Start: 2021-12-03 | End: 2021-12-05 | Stop reason: HOSPADM

## 2021-12-03 RX ORDER — ACETAMINOPHEN 325 MG/1
650 TABLET ORAL EVERY 4 HOURS PRN
Status: DISCONTINUED | OUTPATIENT
Start: 2021-12-03 | End: 2021-12-04 | Stop reason: HOSPADM

## 2021-12-03 RX ADMIN — SODIUM CHLORIDE, POTASSIUM CHLORIDE, SODIUM LACTATE AND CALCIUM CHLORIDE: 600; 310; 30; 20 INJECTION, SOLUTION INTRAVENOUS at 21:51

## 2021-12-03 RX ADMIN — Medication 2 MILLI-UNITS/MIN: at 21:52

## 2021-12-03 ASSESSMENT — ACTIVITIES OF DAILY LIVING (ADL): ADLS_ACUITY_SCORE: 3

## 2021-12-04 ENCOUNTER — ANESTHESIA EVENT (OUTPATIENT)
Dept: OBGYN | Facility: CLINIC | Age: 31
End: 2021-12-04
Payer: COMMERCIAL

## 2021-12-04 ENCOUNTER — ANESTHESIA (OUTPATIENT)
Dept: OBGYN | Facility: CLINIC | Age: 31
End: 2021-12-04
Payer: COMMERCIAL

## 2021-12-04 LAB — SARS-COV-2 RNA RESP QL NAA+PROBE: NEGATIVE

## 2021-12-04 PROCEDURE — 258N000003 HC RX IP 258 OP 636: Performed by: ADVANCED PRACTICE MIDWIFE

## 2021-12-04 PROCEDURE — C9803 HOPD COVID-19 SPEC COLLECT: HCPCS

## 2021-12-04 PROCEDURE — 3E0R3BZ INTRODUCTION OF ANESTHETIC AGENT INTO SPINAL CANAL, PERCUTANEOUS APPROACH: ICD-10-PCS | Performed by: ANESTHESIOLOGY

## 2021-12-04 PROCEDURE — 0HQ9XZZ REPAIR PERINEUM SKIN, EXTERNAL APPROACH: ICD-10-PCS | Performed by: ADVANCED PRACTICE MIDWIFE

## 2021-12-04 PROCEDURE — 250N000011 HC RX IP 250 OP 636: Performed by: ANESTHESIOLOGY

## 2021-12-04 PROCEDURE — 250N000009 HC RX 250: Performed by: ANESTHESIOLOGY

## 2021-12-04 PROCEDURE — 999N000157 HC STATISTIC RCP TIME EA 10 MIN

## 2021-12-04 PROCEDURE — 250N000009 HC RX 250: Performed by: ADVANCED PRACTICE MIDWIFE

## 2021-12-04 PROCEDURE — 250N000011 HC RX IP 250 OP 636: Performed by: ADVANCED PRACTICE MIDWIFE

## 2021-12-04 PROCEDURE — 00HU33Z INSERTION OF INFUSION DEVICE INTO SPINAL CANAL, PERCUTANEOUS APPROACH: ICD-10-PCS | Performed by: ANESTHESIOLOGY

## 2021-12-04 PROCEDURE — 10907ZC DRAINAGE OF AMNIOTIC FLUID, THERAPEUTIC FROM PRODUCTS OF CONCEPTION, VIA NATURAL OR ARTIFICIAL OPENING: ICD-10-PCS | Performed by: ADVANCED PRACTICE MIDWIFE

## 2021-12-04 PROCEDURE — 120N000001 HC R&B MED SURG/OB

## 2021-12-04 PROCEDURE — 722N000001 HC LABOR CARE VAGINAL DELIVERY SINGLE

## 2021-12-04 PROCEDURE — 250N000013 HC RX MED GY IP 250 OP 250 PS 637: Performed by: ADVANCED PRACTICE MIDWIFE

## 2021-12-04 PROCEDURE — 370N000003 HC ANESTHESIA WARD SERVICE

## 2021-12-04 RX ORDER — NALOXONE HYDROCHLORIDE 0.4 MG/ML
0.2 INJECTION, SOLUTION INTRAMUSCULAR; INTRAVENOUS; SUBCUTANEOUS
Status: DISCONTINUED | OUTPATIENT
Start: 2021-12-04 | End: 2021-12-05 | Stop reason: HOSPADM

## 2021-12-04 RX ORDER — IBUPROFEN 800 MG/1
800 TABLET, FILM COATED ORAL EVERY 6 HOURS PRN
Status: DISCONTINUED | OUTPATIENT
Start: 2021-12-04 | End: 2021-12-05 | Stop reason: HOSPADM

## 2021-12-04 RX ORDER — OXYTOCIN/0.9 % SODIUM CHLORIDE 30/500 ML
340 PLASTIC BAG, INJECTION (ML) INTRAVENOUS CONTINUOUS PRN
Status: DISCONTINUED | OUTPATIENT
Start: 2021-12-04 | End: 2021-12-05 | Stop reason: HOSPADM

## 2021-12-04 RX ORDER — MODIFIED LANOLIN
OINTMENT (GRAM) TOPICAL
Status: DISCONTINUED | OUTPATIENT
Start: 2021-12-04 | End: 2021-12-05 | Stop reason: HOSPADM

## 2021-12-04 RX ORDER — CITRIC ACID/SODIUM CITRATE 334-500MG
30 SOLUTION, ORAL ORAL ONCE
Status: DISCONTINUED | OUTPATIENT
Start: 2021-12-04 | End: 2021-12-04 | Stop reason: HOSPADM

## 2021-12-04 RX ORDER — MISOPROSTOL 200 UG/1
400 TABLET ORAL
Status: DISCONTINUED | OUTPATIENT
Start: 2021-12-04 | End: 2021-12-05 | Stop reason: HOSPADM

## 2021-12-04 RX ORDER — BISACODYL 10 MG
10 SUPPOSITORY, RECTAL RECTAL DAILY PRN
Status: DISCONTINUED | OUTPATIENT
Start: 2021-12-04 | End: 2021-12-05 | Stop reason: HOSPADM

## 2021-12-04 RX ORDER — ACETAMINOPHEN 325 MG/1
650 TABLET ORAL EVERY 4 HOURS PRN
Status: DISCONTINUED | OUTPATIENT
Start: 2021-12-04 | End: 2021-12-05 | Stop reason: HOSPADM

## 2021-12-04 RX ORDER — METHYLERGONOVINE MALEATE 0.2 MG/ML
200 INJECTION INTRAVENOUS
Status: DISCONTINUED | OUTPATIENT
Start: 2021-12-04 | End: 2021-12-05 | Stop reason: HOSPADM

## 2021-12-04 RX ORDER — DIPHENHYDRAMINE HCL 25 MG
25 CAPSULE ORAL EVERY 6 HOURS PRN
Status: DISCONTINUED | OUTPATIENT
Start: 2021-12-04 | End: 2021-12-05 | Stop reason: HOSPADM

## 2021-12-04 RX ORDER — CARBOPROST TROMETHAMINE 250 UG/ML
250 INJECTION, SOLUTION INTRAMUSCULAR
Status: DISCONTINUED | OUTPATIENT
Start: 2021-12-04 | End: 2021-12-05 | Stop reason: HOSPADM

## 2021-12-04 RX ORDER — OXYCODONE HYDROCHLORIDE 5 MG/1
5 TABLET ORAL EVERY 4 HOURS PRN
Status: DISCONTINUED | OUTPATIENT
Start: 2021-12-04 | End: 2021-12-05 | Stop reason: HOSPADM

## 2021-12-04 RX ORDER — HYDROCORTISONE 2.5 %
CREAM (GRAM) TOPICAL 3 TIMES DAILY PRN
Status: DISCONTINUED | OUTPATIENT
Start: 2021-12-04 | End: 2021-12-05 | Stop reason: HOSPADM

## 2021-12-04 RX ORDER — NALOXONE HYDROCHLORIDE 0.4 MG/ML
0.4 INJECTION, SOLUTION INTRAMUSCULAR; INTRAVENOUS; SUBCUTANEOUS
Status: DISCONTINUED | OUTPATIENT
Start: 2021-12-04 | End: 2021-12-05 | Stop reason: HOSPADM

## 2021-12-04 RX ORDER — DIPHENHYDRAMINE HYDROCHLORIDE 50 MG/ML
25 INJECTION INTRAMUSCULAR; INTRAVENOUS EVERY 6 HOURS PRN
Status: DISCONTINUED | OUTPATIENT
Start: 2021-12-04 | End: 2021-12-05 | Stop reason: HOSPADM

## 2021-12-04 RX ORDER — LIDOCAINE HYDROCHLORIDE AND EPINEPHRINE 15; 5 MG/ML; UG/ML
INJECTION, SOLUTION EPIDURAL PRN
Status: DISCONTINUED | OUTPATIENT
Start: 2021-12-04 | End: 2021-12-04

## 2021-12-04 RX ORDER — EPHEDRINE SULFATE 50 MG/ML
10 INJECTION, SOLUTION INTRAMUSCULAR; INTRAVENOUS; SUBCUTANEOUS
Status: DISCONTINUED | OUTPATIENT
Start: 2021-12-04 | End: 2021-12-04 | Stop reason: HOSPADM

## 2021-12-04 RX ORDER — MISOPROSTOL 200 UG/1
800 TABLET ORAL
Status: DISCONTINUED | OUTPATIENT
Start: 2021-12-04 | End: 2021-12-05 | Stop reason: HOSPADM

## 2021-12-04 RX ORDER — OXYTOCIN 10 [USP'U]/ML
10 INJECTION, SOLUTION INTRAMUSCULAR; INTRAVENOUS
Status: DISCONTINUED | OUTPATIENT
Start: 2021-12-04 | End: 2021-12-05 | Stop reason: HOSPADM

## 2021-12-04 RX ORDER — BUPIVACAINE HYDROCHLORIDE 2.5 MG/ML
INJECTION, SOLUTION EPIDURAL; INFILTRATION; INTRACAUDAL PRN
Status: DISCONTINUED | OUTPATIENT
Start: 2021-12-04 | End: 2021-12-04

## 2021-12-04 RX ORDER — DOCUSATE SODIUM 100 MG/1
100 CAPSULE, LIQUID FILLED ORAL DAILY
Status: DISCONTINUED | OUTPATIENT
Start: 2021-12-04 | End: 2021-12-05 | Stop reason: HOSPADM

## 2021-12-04 RX ADMIN — Medication: at 12:24

## 2021-12-04 RX ADMIN — KETOROLAC TROMETHAMINE 30 MG: 30 INJECTION, SOLUTION INTRAMUSCULAR at 20:05

## 2021-12-04 RX ADMIN — SODIUM CHLORIDE, POTASSIUM CHLORIDE, SODIUM LACTATE AND CALCIUM CHLORIDE: 600; 310; 30; 20 INJECTION, SOLUTION INTRAVENOUS at 04:50

## 2021-12-04 RX ADMIN — Medication 12 ML/HR: at 12:20

## 2021-12-04 RX ADMIN — BUPIVACAINE HYDROCHLORIDE 5 ML: 2.5 INJECTION, SOLUTION EPIDURAL; INFILTRATION; INTRACAUDAL at 12:20

## 2021-12-04 RX ADMIN — MISOPROSTOL 600 MCG: 200 TABLET ORAL at 16:36

## 2021-12-04 RX ADMIN — Medication 125 ML/HR: at 18:11

## 2021-12-04 RX ADMIN — WITCH HAZEL: 500 SOLUTION RECTAL; TOPICAL at 20:05

## 2021-12-04 RX ADMIN — LIDOCAINE HYDROCHLORIDE,EPINEPHRINE BITARTRATE 3 ML: 15; .005 INJECTION, SOLUTION EPIDURAL; INFILTRATION; INTRACAUDAL; PERINEURAL at 12:13

## 2021-12-04 ASSESSMENT — ACTIVITIES OF DAILY LIVING (ADL)
ADLS_ACUITY_SCORE: 3

## 2021-12-04 NOTE — ANESTHESIA POSTPROCEDURE EVALUATION
Patient: Nj Capellan    Procedure: * No procedures listed *       Diagnosis:* No pre-op diagnosis entered *  Diagnosis Additional Information: No value filed.    Anesthesia Type:  Epidural    Note:     Postop Pain Control: Uneventful            Sign Out: Well controlled pain   PONV: No   Neuro/Psych: Uneventful            Sign Out: Acceptable/Baseline neuro status   Airway/Respiratory: Uneventful            Sign Out: Acceptable/Baseline resp. status   CV/Hemodynamics: Uneventful            Sign Out: Acceptable CV status   Other NRE: NONE   DID A NON-ROUTINE EVENT OCCUR? No           Last vitals:  Vitals Value Taken Time   /65 12/04/21 1739   Temp     Pulse     Resp     SpO2         Electronically Signed By: Jayjay Carl MD  December 4, 2021  5:46 PM

## 2021-12-04 NOTE — ANESTHESIA PROCEDURE NOTES
Epidural catheter Procedure Note    Pre-Procedure   Staff -        Anesthesiologist:  Jayjay Carl MD       Performed By: anesthesiologist       Location: OB       Procedure Start/Stop Times: 12/4/2021 12:10 PM and 12/4/2021 12:16 PM       Pre-Anesthestic Checklist: patient identified, IV checked, risks and benefits discussed, informed consent, monitors and equipment checked, pre-op evaluation, at physician/surgeon's request and post-op pain management  Timeout:       Correct Patient: Yes        Correct Procedure: Yes        Correct Site: Yes        Correct Position: Yes   Procedure Documentation  Procedure: epidural catheter       Patient Position: sitting       Skin prep: Chloraprep       Local skin infiltrated with 5 mL of 1% lidocaine.        Insertion Site: L4-5. (midline approach).       Needle Type: Touhy needle       Needle Gauge: 18.        Needle Length (Inches): 3.5        Catheter: 19 G.         Catheter threaded easily.         4 cm epidural space.         # of attempts: 1 and  # of redirects:  0    Assessment/Narrative         Paresthesias: No.     Test dose of 3 mL at.         Test dose negative, 3 minutes after injection, for signs of intravascular, subdural, or intrathecal injection.       Aspiration negative for Heme or CSF via Epidural Catheter.

## 2021-12-04 NOTE — PROGRESS NOTES
S- Pt breathing well through contractions. Notes pelvic pressure. Is using Nitrous with good relief    O-../69   Temp 98.2  F (36.8  C) (Oral)   Resp 16   LMP 2021   SpO2 100%    Ctx q 2-4  Cat 1 tracing  Pitocin 16mu/min  Cervix 7-8/90/0 per RN at 0800    A-  IOL non reassuring  testing in clinic yesterday  Transition phase of labor  Hep B carrier  Hep A +  Pre pregnancy BMI 33  Hx ; macrosomia (2 successful VBACs)  Iron deficiency anemia  Varicella not immune  +KB after fall at 36 wks    P- Continue pitocin per protocol  Anticipate   Active management 3rd stage    Dr JOHNSON aware of pt status and available as needed

## 2021-12-04 NOTE — PLAN OF CARE
Problem: Adult Inpatient Plan of Care  Goal: Optimal Comfort and Wellbeing  Outcome: Improving     Problem: Change in Fetal Wellbeing (Labor)  Goal: Stable Fetal Wellbeing  Outcome: Improving     Problem: Labor Pain (Labor)  Goal: Acceptable Pain Control  Outcome: Improving     Admitted to unit for induction of labor. Pitocin started at 2153 per orders. Vital signs stable, assessment findings WDL. Category I strip, baseline 135 with accelerations present and no decelerations present. Patient denies presence of contractions, belly feels soft.  supportive at bedside, going home for the night. Continue with current plan of care.

## 2021-12-04 NOTE — L&D DELIVERY NOTE
Vaginal Delivery Note    Name: Nj Capellan  : 1990  MRN: [id number]    PRE DELIVERY DIAGNOSIS  1) 31 year old  5 Para 3 at 40w4d      2) Hepatitis B carrier   3) Hepatitis A positive  4) prepregnancy BMI=33  5) +KB after fall at 36 weeks  6)  Hx  section in   7)  iron deficiency anemia  8)Varicella nonimmune    POST DELIVERY DIAGNOSIS  1) 31 year old  @ 40w4d  2) Delivery of a viable infant weighing   via     YOB: 2022     Birth Time: 2:15 PM       Augmentation No              Indication:   Induction Yes                      Indication: NRFS in clinic the day prior    Monitors: External     GBS: Negative    ROM: AROM  Fluid Type: Meconium    Labor Analgesia/Anesthesia:Nitrous oxide and Epidural    Cord pH obtained: No  Placenta Date/Time: 2021  2:19 PM   Placenta submitted to Pathology: No    Description of procedure:   31 year old  with PNC w/ MWC and pregnancy complicated by See HPI presented to L&D with plan for induction.  Her hospital course was complicated by dysfunctional labor, periodic cat II tracing.  Patient progressed to complete with artificial rupture of membranes and pitocin   Shoulder Dystocia No  Operative Vaginal Delivery No  Infant spontaneously delivered over an 1st degree laceration.   It was repaired in the normal fashion using epidural anesthesia using 3-0 vicryl.  Infant delivered in ROT position.  Nuchal cord Yes    Reduced    Placenta spontaneously delivered: 2021  2:19 PM  grossly intact with 3 vessel cord.  Infant:  Live, vigorous infant  was handed to mom.    Delivery was complicated by nothing Interventions included fundal massage and pitocin.  SCN present at delivery due to meconium stained fluid and variable decelerations during second stage    Delivery QBL (mL): 0    Dr. JOHNSON aware of patient status and remains available for consultation and collaboration as needed.    Mother and Infant stable.    Chloe Avery  MARYJO    12/4/2021 2:30 PM

## 2021-12-04 NOTE — PROGRESS NOTES
Fetal tachycardia (beginning ~0400) remains despite 500 mL fluid bolus and position changes. Shala Mansfield CNM notified and coming to bedside. At 0510, prolonged decel until 0512. RN at bedside for entirety of deceleration, assisting patient with position changes and adjusting US monitor. Two more RN's to bedside to assist. Deceleration resolved while in hands and knees. Patient assisted to right side lying. Shala Mansfield CNM at bedside. SVE 5/70/-2, AROM-ed for meconium stained fluid. Continue with plan of care.

## 2021-12-04 NOTE — H&P
HISTORY AND PHYSICAL UPDATE ADMISSION EXAM    Name: Nj Capellan  YOB: 1990  Medical Record Number: [id number]    History of Present Illness: Nj Capellan is a 31 year old female who is 40w3d pregnant and being admitted for IOL indication 6/8 BPP in clinic today with variable deceleration x1 in triage.    Estimated Date of Delivery: 2021    EGA 40w3d    OB History    Para Term  AB Living   5 3 3 0 1 3   SAB IAB Ectopic Multiple Live Births   1 0 0 0 3      # Outcome Date GA Lbr Jacek/2nd Weight Sex Delivery Anes PTL Lv   5 Current            4 SAB 2020           3 Term 16 40w0d       JAZLYN   2 Term 12 41w5d       JAZLYN   1 Term 10/12/10 40w0d    CS-Unspec   JAZLYN        Lab Results   Component Value Date    GCPCRT Negative 2019    HGB 13.8 2019       Prenatal Complications: Hepatitis B carrier, Hepatitis A positive, prepregnancy BMI=33, +KB after fall at 36 weeks, Hx  section in  for fetal macrosomia >4500, has had two subsequent VBACs, history of iron deficiency anemia, Varicella nonimmune  PMH: None  PSH: None    Exam:          Fetal heart Rate 140 baseline, moderate variability, +accelerations, variable deceleration x1  Contractions Rare, soft uterine tone palpated    HEENT grossly normal  Neck: no lymphadenopathy or thryoidomegaly  Lungs Clear, no respiratory distress  Heart RRR  ABD gravid, non-tender  EXT:  Trace edema, moves freely  Vaginal exam 3.5/60/-3  Membranes: intact    Assessment: induction of labor, indication non-reassurring  testing. Hep B carrier, Hep A positive    Plan: Admit - see IP orders  JIC labs and continuous monitoring owing to TOLAC status  Avoid internal monitoring due to Hep B carrier status  Labor induction with Pitocin  Pain medication PRN  Anticipate     Prenatal record reviewed.    Dr. Hinton aware of patient admission and TOLAC status. She remains available for consultation and collaboration  as needed.     MED Cardenas CNM      12/3/2021   8:19 PM

## 2021-12-04 NOTE — PROGRESS NOTES
S- Pt has been pushing since 1100 and is tired, requesting an epidural. FOB at bedside and supportive    O-./69   Temp 97.4  F (36.3  C) (Oral)   Resp 16   LMP 02/23/2021   SpO2 100%    Ctx q 2-4min, strong  Cat 1-2 tracing. Moderate variability with variables during contractions  Pitocin 18mu/min  Pt has been pushing since 1100 with weak to fair efforts.  Baby palpates in the OP presentation and asynclitic.  No caput. baby has descended since she started pushing.  She reports fatigue and requested epidural and to rest around 1200.  Anaesthesia is currently in the room placing it.     A- second stage of labor  Cat I and II tracing    P- Continue to monitor and support  Will have pt rest and labor down for 1hr following placement of epid  Continue to monitor EFM closely  Encourage position changes    Dr JOHNSON has been updated regarding pt status

## 2021-12-04 NOTE — ANESTHESIA PREPROCEDURE EVALUATION
Anesthesia Pre-Procedure Evaluation    Patient: Nj Capellan   MRN: [id number] : 1990        Preoperative Diagnosis: * No surgery found *    Procedure :           No past medical history on file.   Past Surgical History:   Procedure Laterality Date      SECTION        No Known Allergies   Social History     Tobacco Use     Smoking status: Never Smoker     Smokeless tobacco: Never Used   Substance Use Topics     Alcohol use: Never      Wt Readings from Last 1 Encounters:   10/30/21 93.9 kg (207 lb)        Anesthesia Evaluation            ROS/MED HX  ENT/Pulmonary:  - neg pulmonary ROS     Neurologic:  - neg neurologic ROS     Cardiovascular:  - neg cardiovascular ROS     METS/Exercise Tolerance:     Hematologic:  - neg hematologic  ROS     Musculoskeletal:  - neg musculoskeletal ROS     GI/Hepatic:  - neg GI/hepatic ROS     Renal/Genitourinary:  - neg Renal ROS     Endo:  - neg endo ROS     Psychiatric/Substance Use:  - neg psychiatric ROS     Infectious Disease:  - neg infectious disease ROS     Malignancy:  - neg malignancy ROS     Other:  - neg other ROS          Physical Exam    Airway  airway exam normal      Mallampati: II       Respiratory Devices and Support         Dental  no notable dental history         Cardiovascular   cardiovascular exam normal          Pulmonary   pulmonary exam normal                OUTSIDE LABS:  CBC:   Lab Results   Component Value Date    WBC 8.1 2021    WBC 7.6 2019    HGB 12.4 2021    HGB 13.8 2019    HCT 38.1 2021    HCT 43.0 2019     2021     2019     BMP:   Lab Results   Component Value Date     2019    POTASSIUM 4.1 2019    CHLORIDE 103 2019    CO2 24 2019    BUN 10 2019    CR 0.69 2019     2019     COAGS: No results found for: PTT, INR, FIBR  POC:   Lab Results   Component Value Date    HCG Negative 10/02/2019    HCGS Negative 2019      HEPATIC:   Lab Results   Component Value Date    ALBUMIN 3.8 09/06/2019    PROTTOTAL 8.4 (H) 09/06/2019    ALT 17 09/06/2019    AST 20 09/06/2019    ALKPHOS 102 09/06/2019    BILITOTAL 0.5 09/06/2019     OTHER:   Lab Results   Component Value Date    LIZY 9.5 09/06/2019    LIPASE 10 09/06/2019       Anesthesia Plan    ASA Status:  2      Anesthesia Type: Epidural.              Consents    Anesthesia Plan(s) and associated risks, benefits, and realistic alternatives discussed. Questions answered and patient/representative(s) expressed understanding.    - Discussed:     - Discussed with:  Patient         Postoperative Care            Comments:                Jayjay Carl MD

## 2021-12-04 NOTE — PROGRESS NOTES
S: Nj is resting in bed. She feels her contractions at tightenings, but reports they are not painful and she is able to sleep through them.    O: /58   Temp 98.5  F (36.9  C) (Oral)   Resp 14   LMP 2021   SpO2 100%   EFM: 135 baseline, moderate variability, +accelerations, no decelerations; had a period of fetal tachycardia in the 170s for 65 minutes that resolved with a fluid flush and repositioning.  [place]: q2-5 minutes, lasting 60-70, palpate mild, soft uterine resting tone  SVE: /-2    A:  at 40w4d IOL for nonreassuring  testing. TOLAC status.    P: Reviewed with Nj as the fetal tracing looks reassuring at the present time and she appears to be having regular contractions, offered AROM. She consented. AROM performed returning meconium stained fluid. We will turn the Pitocin down to 8mU/minute and hold for 30 minutes before titrating back upward.    Dr. Hinton remains available for consultation and collaboration as needed.    MED Cardenas CNM

## 2021-12-05 VITALS
TEMPERATURE: 98 F | SYSTOLIC BLOOD PRESSURE: 113 MMHG | RESPIRATION RATE: 16 BRPM | DIASTOLIC BLOOD PRESSURE: 70 MMHG | OXYGEN SATURATION: 100 % | HEART RATE: 70 BPM

## 2021-12-05 LAB
HGB BLD-MCNC: 10.2 G/DL (ref 11.7–15.7)
T PALLIDUM AB SER QL: NONREACTIVE

## 2021-12-05 PROCEDURE — 36415 COLL VENOUS BLD VENIPUNCTURE: CPT | Performed by: ADVANCED PRACTICE MIDWIFE

## 2021-12-05 PROCEDURE — 250N000013 HC RX MED GY IP 250 OP 250 PS 637: Performed by: ADVANCED PRACTICE MIDWIFE

## 2021-12-05 PROCEDURE — 85018 HEMOGLOBIN: CPT | Performed by: ADVANCED PRACTICE MIDWIFE

## 2021-12-05 RX ORDER — IBUPROFEN 800 MG/1
800 TABLET, FILM COATED ORAL EVERY 6 HOURS PRN
Qty: 30 TABLET | Refills: 0 | Status: SHIPPED | OUTPATIENT
Start: 2021-12-05 | End: 2022-05-11

## 2021-12-05 RX ORDER — DOCUSATE SODIUM 100 MG/1
100 CAPSULE, LIQUID FILLED ORAL 2 TIMES DAILY PRN
Qty: 30 CAPSULE | Refills: 0 | Status: SHIPPED | OUTPATIENT
Start: 2021-12-05 | End: 2022-05-11

## 2021-12-05 RX ADMIN — IBUPROFEN 800 MG: 800 TABLET, FILM COATED ORAL at 13:23

## 2021-12-05 RX ADMIN — IBUPROFEN 800 MG: 800 TABLET, FILM COATED ORAL at 05:25

## 2021-12-05 ASSESSMENT — ACTIVITIES OF DAILY LIVING (ADL)
ADLS_ACUITY_SCORE: 3

## 2021-12-05 NOTE — PROGRESS NOTES
PT QBL just under 1000mls. Feeling fine. Denies dizziness, SOB or tachycardia.  S/p 600mg po cytotec approx 1+ hrs ago.  1300cc straight cath of urine performed.  Hanging a second bag of IV pitocin Currently bleeding very slight.  Fundus firm/1-u

## 2021-12-05 NOTE — PLAN OF CARE
V/S WNL.  Fundus firm, lochia light.  Up and about independently.  Voiding freely.  Independent in self and infant cares.  Pain well managed with oral analgesics.      Problem: Adjustment to Role Transition (Postpartum Vaginal Delivery)  Goal: Successful Maternal Role Transition  Outcome: Improving     Problem: Bleeding (Postpartum Vaginal Delivery)  Goal: Hemostasis  Outcome: Improving     Problem: Infection (Postpartum Vaginal Delivery)  Goal: Absence of Infection Signs and Symptoms  Outcome: Improving     Problem: Pain (Postpartum Vaginal Delivery)  Goal: Acceptable Pain Control  Outcome: Improving  Intervention: Prevent or Manage Pain  Recent Flowsheet Documentation  Taken 12/5/2021 0015 by Juanito Atkinson RN  Complementary Therapy: essential oils utilized  Taken 12/4/2021 2015 by Juanito Atkinson RN  Pain Management Interventions:   medication (see MAR)   emotional support   care clustered  Complementary Therapy: essential oils utilized     Problem: Urinary Retention (Postpartum Vaginal Delivery)  Goal: Effective Urinary Elimination  Outcome: Improving     Problem: Bleeding (Labor)  Goal: Hemostasis  Outcome: Completed     Problem: Change in Fetal Wellbeing (Labor)  Goal: Stable Fetal Wellbeing  Outcome: Completed  Intervention: Promote and Monitor Fetal Wellbeing  Recent Flowsheet Documentation  Taken 12/5/2021 0015 by Juanito Atkinson RN  Body Position: position changed independently  Taken 12/4/2021 2015 by Juanito Atkinson RN  Body Position: position changed independently     Problem: Delayed Labor Progression (Labor)  Goal: Effective Progression to Delivery  Outcome: Completed     Problem: Infection (Labor)  Goal: Absence of Infection Signs and Symptoms  Outcome: Completed     Problem: Labor Pain (Labor)  Goal: Acceptable Pain Control  Outcome: Completed  Intervention: Support Labor Pain Coping and Management  Recent Flowsheet Documentation  Taken 12/5/2021 0015 by Juanito Atkinson, RN  Complementary  Therapy: essential oils utilized  Taken 12/4/2021 2015 by Juanito Atkinson, RN  Complementary Therapy: essential oils utilized     Problem: Uterine Tachysystole (Labor)  Goal: Normal Uterine Contraction Pattern  Outcome: Completed

## 2021-12-05 NOTE — PLAN OF CARE
Patient to discharge with all belongings. Education complete and all questions answered to verbalized patient satisfaction.   Xenia Kothari RN

## 2021-12-05 NOTE — DISCHARGE SUMMARY
OB Discharge Summary      Date:  2021    Name:  Nj Capellan  :  1990  MRN:  [id number]      Admission Date:  12/3/2021  Delivery Date: 21  Gestational Age at Delivery:  40w4d  Discharge Date:  2021    Principal Diagnosis:    Patient Active Problem List   Diagnosis     History of diverticulitis     Missed period     Encounter for triage in pregnant patient     Encounter for induction of labor       Conditions complicating Pregnancy:  Hepatitis B carrier   Hepatitis A positive  prepregnancy BMI=33  +KB after fall at 36 weeks  Hx  section  History of iron deficiency anemia   Varicella nonimmune    Indication for :  NA  Indication for Induction:  Non reassuring FHT in clinic     Condition at Discharge:  Stable  Discharge Medications:       Discharge Plan:    Follow up with /MARYJO:  6 weeks   Patient Instructions:      Physical activity: as tolerated    Diet: as tolerated            Physician/BRUCEM:  Chloe Rangel CNM    Name:  Nj Capellan  :  1990  MRN:  [id number]

## 2022-03-15 ENCOUNTER — MEDICAL CORRESPONDENCE (OUTPATIENT)
Dept: HEALTH INFORMATION MANAGEMENT | Facility: CLINIC | Age: 32
End: 2022-03-15
Payer: COMMERCIAL

## 2022-05-11 ENCOUNTER — OFFICE VISIT (OUTPATIENT)
Dept: FAMILY MEDICINE | Facility: CLINIC | Age: 32
End: 2022-05-11
Payer: COMMERCIAL

## 2022-05-11 VITALS
OXYGEN SATURATION: 99 % | WEIGHT: 194 LBS | DIASTOLIC BLOOD PRESSURE: 70 MMHG | BODY MASS INDEX: 29.5 KG/M2 | HEART RATE: 102 BPM | SYSTOLIC BLOOD PRESSURE: 110 MMHG

## 2022-05-11 DIAGNOSIS — G89.29 CHRONIC BILATERAL LOW BACK PAIN WITHOUT SCIATICA: ICD-10-CM

## 2022-05-11 DIAGNOSIS — M54.50 CHRONIC BILATERAL LOW BACK PAIN WITHOUT SCIATICA: ICD-10-CM

## 2022-05-11 DIAGNOSIS — M25.571 PAIN IN JOINT, ANKLE AND FOOT, RIGHT: ICD-10-CM

## 2022-05-11 DIAGNOSIS — Z91.81 PERSONAL HISTORY OF FALL: Primary | ICD-10-CM

## 2022-05-11 DIAGNOSIS — M79.671 RIGHT FOOT PAIN: ICD-10-CM

## 2022-05-11 PROCEDURE — 99213 OFFICE O/P EST LOW 20 MIN: CPT | Performed by: FAMILY MEDICINE

## 2022-05-11 NOTE — PROGRESS NOTES
Problem List Items Addressed This Visit    None     Visit Diagnoses     Personal history of fall    -  Primary    Right foot pain        Relevant Orders    XR Foot Right G/E 3 Views    Physical Therapy Referral    Pain in joint, ankle and foot, right        Relevant Orders    XR Ankle Right G/E 3 Views    Physical Therapy Referral    Chronic bilateral low back pain without sciatica        Relevant Orders    XR Lumbar Spine 2/3 Views    Physical Therapy Referral        We did not have x-ray available in clinic today and she will return to clinic tomorrow to have her x-rays taken.  As long as these come back normal, my recommendation would be physical therapy as a next step for treatment and the patient was in agreement with that.  If after she completes physical therapy the pain persist, return to clinic for further evaluation.    YOLANDA Mauro is a 32 year old who presents today with ongoing issues of pain related to a fall that occurred on October 29, 2021.  The patient was in her third trimester of pregnancy at the time and was shopping at [employer] that day when she slipped on wet conrad.  The patient describes that her left foot slipped out from under her forward, her right leg went to the right, and she fell directly on her buttocks.  Initially, the patient was very concerned about the pregnancy and did ultimately go to the emergency room for further evaluation.  However, no x-rays were done because she was pregnant and she was told to follow-up if the pain persists postpartum.  The patient delivered a healthy baby boy in December but has persistent pain.  She describes pain in her right foot around the arch as well as all around the right ankle.  In addition, the patient describes bilateral pain around the SI joints and sometimes the lumbar spine.  She has not had any evaluation for these yet although she definitely states both pain started right after her fall.        Objective    BP  110/70 (BP Location: Left arm, Patient Position: Left side, Cuff Size: Adult Large)   Pulse 102   Wt 88 kg (194 lb)   SpO2 99%   BMI 29.50 kg/m    Body mass index is 29.5 kg/m .  Physical Exam   BACK: mildly tender to palpation over lumbar spine mid portion as well as bilateral SI joints  RIGHT FOOT/ANKLE: no obvious swelling of ankle or foot, but tender palpating mid arch and tenderness around ankle joint

## 2022-05-17 ENCOUNTER — HOSPITAL ENCOUNTER (OUTPATIENT)
Dept: RADIOLOGY | Facility: HOSPITAL | Age: 32
Discharge: HOME | End: 2022-05-17
Attending: FAMILY MEDICINE
Payer: COMMERCIAL

## 2022-05-17 DIAGNOSIS — M25.571 PAIN IN JOINT, ANKLE AND FOOT, RIGHT: ICD-10-CM

## 2022-05-17 DIAGNOSIS — M79.671 RIGHT FOOT PAIN: ICD-10-CM

## 2022-05-17 DIAGNOSIS — G89.29 CHRONIC BILATERAL LOW BACK PAIN WITHOUT SCIATICA: ICD-10-CM

## 2022-05-17 DIAGNOSIS — M54.50 CHRONIC BILATERAL LOW BACK PAIN WITHOUT SCIATICA: ICD-10-CM

## 2022-05-17 PROCEDURE — 72100 X-RAY EXAM L-S SPINE 2/3 VWS: CPT

## 2022-05-17 PROCEDURE — 73610 X-RAY EXAM OF ANKLE: CPT | Mod: RT

## 2022-05-17 PROCEDURE — 73630 X-RAY EXAM OF FOOT: CPT | Mod: RT

## 2022-05-20 ENCOUNTER — HOSPITAL ENCOUNTER (OUTPATIENT)
Dept: PHYSICAL THERAPY | Facility: REHABILITATION | Age: 32
Discharge: HOME | End: 2022-05-20
Attending: FAMILY MEDICINE
Payer: COMMERCIAL

## 2022-05-20 DIAGNOSIS — M25.571 PAIN IN JOINT, ANKLE AND FOOT, RIGHT: ICD-10-CM

## 2022-05-20 DIAGNOSIS — M54.50 CHRONIC BILATERAL LOW BACK PAIN WITHOUT SCIATICA: ICD-10-CM

## 2022-05-20 DIAGNOSIS — M79.671 RIGHT FOOT PAIN: ICD-10-CM

## 2022-05-20 DIAGNOSIS — G89.29 CHRONIC BILATERAL LOW BACK PAIN WITHOUT SCIATICA: ICD-10-CM

## 2022-05-20 PROCEDURE — 97161 PT EVAL LOW COMPLEX 20 MIN: CPT | Mod: GP | Performed by: PHYSICAL THERAPIST

## 2022-05-20 PROCEDURE — 97110 THERAPEUTIC EXERCISES: CPT | Mod: GP | Performed by: PHYSICAL THERAPIST

## 2022-05-20 NOTE — PROGRESS NOTES
[facility]    OUTPATIENT PHYSICAL THERAPY ORTHOPEDIC EVALUATION  PLAN OF TREATMENT FOR OUTPATIENT REHABILITATION  (COMPLETE FOR INITIAL CLAIMS ONLY)  Patient's Last Name, First Name, M.I.  YOB: 1990  [place] Capell[place]    Provider s Name:  [facility]   Medical Record No.  [id number]   Start of Care Date:  05/20/22   Onset Date:  10/26/21   Type:     _X__PT   ___OT   ___SLP Medical Diagnosis:  Right foot pain (M79.671)     Pain in joint, ankle and foot, right (M25.571)     Chronic bilateral low back pain without sciatica (M54.50, G89.29)     PT Diagnosis:  Pt with decreased strength in right ankle and pain in ankle and back limiting her ability to walk comfortably.   Visits from SOC:  1      _________________________________________________________________________________  Plan of Treatment/Functional Goals:  joint mobilization, manual therapy, neuromuscular re-education, ROM, strengthening, stretching           Goals  Goal Identifier: Sleeping  Goal Description: Pt will be able to sleep at night without numbness or pain in her back or ankle bothering her at night in 12 weeks.  Target Date: 08/18/22    Goal Identifier:   Goal Description: Pt will be able to perform all cares for her baby without an increse in pain in her back or right leg in 12 weeks.  Target Date: 08/18/22    Goal Identifier: Walking  Goal Description: Pt will be able to walk at a grocery store without an increase in pain in her right ankle in 12 weeks.  Target Date: 08/18/22                                                           Therapy Frequency:  2 times/Week  Predicted Duration of Therapy Intervention:  12 weeks    Shara Ly, PT                 I CERTIFY THE NEED FOR THESE SERVICES FURNISHED UNDER        THIS PLAN OF TREATMENT AND WHILE UNDER MY CARE     (Physician  co-signature of this document indicates review and certification of the therapy plan).                     Certification Date From:  05/20/22   Certification Date To:  08/18/22    Referring Provider:  Nalini Mane    Initial Assessment        See Epic Evaluation Start of Care Date: 05/20/22 05/20/22 1351   General Information   Type of Visit Initial OP Ortho PT Evaluation   Start of Care Date 05/20/22   Referring Physician Nalini Mane   Patient/Family Goals Statement To reduce her pain in her right ankle, leg, and back.   Orders Comment Right foot pain (M79.671)     Pain in joint, ankle and foot, right (M25.571)     Chronic bilateral low back pain without sciatica (M54.50, G89.29)   Date of Order 05/20/22   Certification Required? Yes   Medical Diagnosis Right foot pain (M79.671)     Pain in joint, ankle and foot, right (M25.571)     Chronic bilateral low back pain without sciatica (M54.50, G89.29)   Surgical/Medical history reviewed Yes   General Information Comments Pt reports that in October of 2021 she was walking in [employer] while 36 weeks pregnant and slipped on the floor and fell landing on her right leg and right hand hurting her right low back, right foot, right ankle, right calf, right hand, right wrist, right upper back, and right shoulder.  She states because she was 36 weeks pregnant they didn't do any x-rays, but instead checked the baby.  Then the baby was born about a month later and did not sleep well.  She said her pain continued and she went to a [place] massage place at the mall, but it did not help.       Present No   Language   (Pt can speak English)   Body Part(s)   Body Part(s) Ankle/Foot;Lumbar Spine/SI   Presentation and Etiology   Pertinent history of current problem (include personal factors and/or comorbidities that impact the POC) Pt reports that in October of 2021 she was walking in [employer] while 36 weeks pregnant and slipped on the floor and  fell landing on her right leg and right hand hurting her right low back, right foot, right ankle, right calf, right hand, right wrist, right upper back, and right shoulder.  She states because she was 36 weeks pregnant they didn't do any x-rays, but instead checked the baby.  Then the baby was born about a month later and did not sleep well.  She said her pain continued and she went to a [place] massage place at the mall, but it did not help.   Impairments A. Pain;F. Decreased strength and endurance;D. Decreased ROM   Functional Limitations perform activities of daily living   How/Where did it occur With a fall   Onset date of current episode/exacerbation 10/26/21   Chronicity Chronic   Pain quality   (8/10)   Pain/symptoms exacerbated by A. Sitting;B. Walking;C. Lifting;D. Carrying;K. Home tasks   Pain/symptoms eased by C. Rest   Current / Previous Interventions   Diagnostic Tests: X-ray   X-ray Results unremarkable   Fall Risk Screen   Fall screen completed by PT   Have you fallen 2 or more times in the past year? No   Have you fallen and had an injury in the past year? Yes   Is patient a fall risk? No   Fall screen comments Pt only fell because floor was wet.  Pt is able to ambulate and balance safely.  Pt is not a falls risk.   Abuse Screen (yes response referral indicated)   Feels Unsafe at Home or Work/School no   Feels Threatened by Someone no   Does Anyone Try to Keep You From Having Contact with Others or Doing Things Outside Your Home? no   Physical Signs of Abuse Present no   Ankle/Foot Objective Findings   Observation No observable swelling in right ankle compared to left.   Ankle/Foot ROM Comment All Right ankle AROM WNL   Palpation Pt tender in right ankle around medial malleolus, navicular, achilles, central calf   Gait/Locomotion Normal   Foot Position In Standing Slight increase in pronation   Ankle/Foot Strength Comments All 5/5, except right ankle inversion 3+/5 with some pain   Posture    (Excessive lumbar lordosis, hypertrophied lumbar and thoracic paraspinals.)   Lumbar Spine/SI Objective Findings   Flexion ROM WNL   Extension ROM WNL   Right Side Bending ROM WNL   Left Side Bending ROM WNL   Hip Flexion (L2) Strength 5/5   Hip Abduction Strength 5/5   Hip Extension Strength 5/5   Knee Flexion Strength 5/5   Knee Extension (L3) Strength 5/5   Ankle Dorsiflexion (L4) Strength 5/5   Ankle Plantar Flexion (S1) Strength 5/5   SLR R 60 degrees with pain in R hip and knee, L 85 degrees   Segmental Mobility P/A Assessment of T10-L5 hypomobile and tender   Palpation Tender and tight in right thoracic and lumbar paraspinals. Tender over B PSIS, and over T10-L5 spinous processes.   Observation Pt with excessive genu recuvatum and lumbar lordosis   Posture Pt with excessive genu recuvatum and lumbar lordosis   Planned Therapy Interventions   Planned Therapy Interventions joint mobilization;manual therapy;neuromuscular re-education;ROM;strengthening;stretching   Clinical Impression   Criteria for Skilled Therapeutic Interventions Met yes, treatment indicated   PT Diagnosis Pt with decreased strength in right ankle and pain in ankle and back limiting her ability to walk comfortably.   Influenced by the following impairments decreased ankle strength, decreased core strength, pain   Functional limitations due to impairments Walking, house work, caring for baby   Clinical Presentation Stable/Uncomplicated   Clinical Decision Making (Complexity) Low complexity   Therapy Frequency 2 times/Week   Predicted Duration of Therapy Intervention (days/wks) 12 weeks   Risk & Benefits of therapy have been explained Yes   Patient, Family & other staff in agreement with plan of care Yes   Clinical Impression Comments PT accidentally took pt scheduled after her before her, so she was started late.  Eval and treatment cut short because pt had to leave to get home to her children so her  could go to work.   ORTHO GOALS    PT Ortho Eval Goals 3   Ortho Goal 1   Goal Identifier Sleeping   Goal Description Pt will be able to sleep at night without numbness or pain in her back or ankle bothering her at night in 12 weeks.   Target Date 08/18/22   Ortho Goal 2   Goal Identifier    Goal Description Pt will be able to perform all cares for her baby without an increse in pain in her back or right leg in 12 weeks.   Target Date 08/18/22   Ortho Goal 3   Goal Identifier Walking   Goal Description Pt will be able to walk at a grocery store without an increase in pain in her right ankle in 12 weeks.   Target Date 08/18/22   Total Evaluation Time   PT Eval, Low Complexity Minutes (51627) 25   Therapy Certification   Certification date from 05/20/22   Certification date to 08/18/22   Medical Diagnosis Right foot pain (M79.671)     Pain in joint, ankle and foot, right (M25.571)     Chronic bilateral low back pain without sciatica (M54.50, G89.29)

## 2022-06-09 ENCOUNTER — HOSPITAL ENCOUNTER (OUTPATIENT)
Dept: PHYSICAL THERAPY | Facility: REHABILITATION | Age: 32
Discharge: HOME | End: 2022-06-09
Payer: COMMERCIAL

## 2022-06-09 DIAGNOSIS — M54.50 CHRONIC BILATERAL LOW BACK PAIN WITHOUT SCIATICA: ICD-10-CM

## 2022-06-09 DIAGNOSIS — M25.571 PAIN IN JOINT, ANKLE AND FOOT, RIGHT: ICD-10-CM

## 2022-06-09 DIAGNOSIS — G89.29 CHRONIC BILATERAL LOW BACK PAIN WITHOUT SCIATICA: ICD-10-CM

## 2022-06-09 DIAGNOSIS — M79.671 RIGHT FOOT PAIN: Primary | ICD-10-CM

## 2022-06-09 PROCEDURE — 97110 THERAPEUTIC EXERCISES: CPT | Mod: GP | Performed by: PHYSICAL THERAPIST

## 2022-06-09 NOTE — DISCHARGE INSTRUCTIONS
X5-10 reps 2-3x/day      X3 reps - if feels still tight can do up to 10 reps 2-3x/day    Keep doing ankle exercise from before

## 2022-06-16 ENCOUNTER — HOSPITAL ENCOUNTER (OUTPATIENT)
Dept: PHYSICAL THERAPY | Facility: REHABILITATION | Age: 32
Discharge: HOME | End: 2022-06-16
Payer: COMMERCIAL

## 2022-06-16 DIAGNOSIS — M54.50 CHRONIC BILATERAL LOW BACK PAIN WITHOUT SCIATICA: ICD-10-CM

## 2022-06-16 DIAGNOSIS — M25.571 PAIN IN JOINT, ANKLE AND FOOT, RIGHT: ICD-10-CM

## 2022-06-16 DIAGNOSIS — G89.29 CHRONIC BILATERAL LOW BACK PAIN WITHOUT SCIATICA: ICD-10-CM

## 2022-06-16 DIAGNOSIS — M79.671 RIGHT FOOT PAIN: Primary | ICD-10-CM

## 2022-06-16 PROCEDURE — 97110 THERAPEUTIC EXERCISES: CPT | Mod: GP | Performed by: PHYSICAL THERAPIST

## 2022-06-16 PROCEDURE — 97535 SELF CARE MNGMENT TRAINING: CPT | Mod: GP | Performed by: PHYSICAL THERAPIST

## 2022-06-16 NOTE — DISCHARGE INSTRUCTIONS
STRENGTHENING      X10-15 reps 1-2 sets 1-2x/day      X10-15 reps 1-2 sets 1-2x/day         Already stretching            GENTLE

## 2022-07-21 ENCOUNTER — HOSPITAL ENCOUNTER (OUTPATIENT)
Dept: PHYSICAL THERAPY | Facility: REHABILITATION | Age: 32
Discharge: HOME | End: 2022-07-21
Payer: COMMERCIAL

## 2022-07-21 DIAGNOSIS — M79.671 RIGHT FOOT PAIN: Primary | ICD-10-CM

## 2022-07-21 DIAGNOSIS — M54.50 CHRONIC BILATERAL LOW BACK PAIN WITHOUT SCIATICA: ICD-10-CM

## 2022-07-21 DIAGNOSIS — M25.571 PAIN IN JOINT, ANKLE AND FOOT, RIGHT: ICD-10-CM

## 2022-07-21 DIAGNOSIS — G89.29 CHRONIC BILATERAL LOW BACK PAIN WITHOUT SCIATICA: ICD-10-CM

## 2022-07-21 PROCEDURE — 97140 MANUAL THERAPY 1/> REGIONS: CPT | Mod: GP | Performed by: PHYSICAL THERAPIST

## 2022-07-21 PROCEDURE — 97110 THERAPEUTIC EXERCISES: CPT | Mod: GP | Performed by: PHYSICAL THERAPIST

## 2022-07-21 NOTE — DISCHARGE INSTRUCTIONS
Rub sideways on inside Achilles tendon x4-5 minutes with oil    Do diagonal ankle movements with band around foot    X10-15 reps 1-2 sets 1-2x/day

## 2022-07-23 ENCOUNTER — HEALTH MAINTENANCE LETTER (OUTPATIENT)
Age: 32
End: 2022-07-23

## 2022-07-28 ENCOUNTER — OFFICE VISIT (OUTPATIENT)
Dept: FAMILY MEDICINE | Facility: CLINIC | Age: 32
End: 2022-07-28
Payer: COMMERCIAL

## 2022-07-28 ENCOUNTER — HOSPITAL ENCOUNTER (OUTPATIENT)
Dept: PHYSICAL THERAPY | Facility: REHABILITATION | Age: 32
Discharge: HOME | End: 2022-07-28
Payer: COMMERCIAL

## 2022-07-28 VITALS
BODY MASS INDEX: 29.04 KG/M2 | HEART RATE: 75 BPM | SYSTOLIC BLOOD PRESSURE: 118 MMHG | DIASTOLIC BLOOD PRESSURE: 68 MMHG | WEIGHT: 191 LBS | OXYGEN SATURATION: 99 %

## 2022-07-28 DIAGNOSIS — M79.671 RIGHT FOOT PAIN: ICD-10-CM

## 2022-07-28 DIAGNOSIS — G89.29 CHRONIC BILATERAL LOW BACK PAIN WITHOUT SCIATICA: ICD-10-CM

## 2022-07-28 DIAGNOSIS — M25.571 PAIN IN JOINT, ANKLE AND FOOT, RIGHT: ICD-10-CM

## 2022-07-28 DIAGNOSIS — Z91.81 PERSONAL HISTORY OF FALL: ICD-10-CM

## 2022-07-28 DIAGNOSIS — M79.671 RIGHT FOOT PAIN: Primary | ICD-10-CM

## 2022-07-28 DIAGNOSIS — E55.9 VITAMIN D DEFICIENCY: ICD-10-CM

## 2022-07-28 DIAGNOSIS — D64.9 ANEMIA, UNSPECIFIED TYPE: Primary | ICD-10-CM

## 2022-07-28 DIAGNOSIS — Z00.00 HEALTHCARE MAINTENANCE: ICD-10-CM

## 2022-07-28 DIAGNOSIS — M54.50 CHRONIC BILATERAL LOW BACK PAIN WITHOUT SCIATICA: ICD-10-CM

## 2022-07-28 LAB
ERYTHROCYTE [DISTWIDTH] IN BLOOD BY AUTOMATED COUNT: 15.4 % (ref 10–15)
FERRITIN SERPL-MCNC: 37 NG/ML (ref 6–175)
HCT VFR BLD AUTO: 40.9 % (ref 35–47)
HGB BLD-MCNC: 13.4 G/DL (ref 11.7–15.7)
HOLD SPECIMEN: NORMAL
MCH RBC QN AUTO: 26.5 PG (ref 26.5–33)
MCHC RBC AUTO-ENTMCNC: 32.8 G/DL (ref 31.5–36.5)
MCV RBC AUTO: 81 FL (ref 78–100)
PLATELET # BLD AUTO: 278 10E3/UL (ref 150–450)
RBC # BLD AUTO: 5.05 10E6/UL (ref 3.8–5.2)
WBC # BLD AUTO: 5.4 10E3/UL (ref 4–11)

## 2022-07-28 PROCEDURE — 86704 HEP B CORE ANTIBODY TOTAL: CPT | Performed by: FAMILY MEDICINE

## 2022-07-28 PROCEDURE — 90715 TDAP VACCINE 7 YRS/> IM: CPT | Performed by: FAMILY MEDICINE

## 2022-07-28 PROCEDURE — 90471 IMMUNIZATION ADMIN: CPT | Performed by: FAMILY MEDICINE

## 2022-07-28 PROCEDURE — 86762 RUBELLA ANTIBODY: CPT | Performed by: FAMILY MEDICINE

## 2022-07-28 PROCEDURE — 99214 OFFICE O/P EST MOD 30 MIN: CPT | Mod: 25 | Performed by: FAMILY MEDICINE

## 2022-07-28 PROCEDURE — 86735 MUMPS ANTIBODY: CPT | Performed by: FAMILY MEDICINE

## 2022-07-28 PROCEDURE — 82728 ASSAY OF FERRITIN: CPT | Performed by: FAMILY MEDICINE

## 2022-07-28 PROCEDURE — 82306 VITAMIN D 25 HYDROXY: CPT | Performed by: FAMILY MEDICINE

## 2022-07-28 PROCEDURE — 97140 MANUAL THERAPY 1/> REGIONS: CPT | Mod: GP | Performed by: PHYSICAL THERAPIST

## 2022-07-28 PROCEDURE — 86705 HEP B CORE ANTIBODY IGM: CPT | Performed by: FAMILY MEDICINE

## 2022-07-28 PROCEDURE — 86765 RUBEOLA ANTIBODY: CPT | Performed by: FAMILY MEDICINE

## 2022-07-28 PROCEDURE — 97112 NEUROMUSCULAR REEDUCATION: CPT | Mod: GP | Performed by: PHYSICAL THERAPIST

## 2022-07-28 PROCEDURE — 97110 THERAPEUTIC EXERCISES: CPT | Mod: GP | Performed by: PHYSICAL THERAPIST

## 2022-07-28 PROCEDURE — 85027 COMPLETE CBC AUTOMATED: CPT | Performed by: FAMILY MEDICINE

## 2022-07-28 PROCEDURE — 86706 HEP B SURFACE ANTIBODY: CPT | Performed by: FAMILY MEDICINE

## 2022-07-28 PROCEDURE — 86787 VARICELLA-ZOSTER ANTIBODY: CPT | Performed by: FAMILY MEDICINE

## 2022-07-28 PROCEDURE — 36415 COLL VENOUS BLD VENIPUNCTURE: CPT | Performed by: FAMILY MEDICINE

## 2022-07-28 NOTE — PROGRESS NOTES
Problem List Items Addressed This Visit    None     Visit Diagnoses     Anemia, unspecified type    -  Primary    anemia has resolved; MCV on low side of normal and RDW slightly elevated. Checked ferritin level today.    Relevant Orders    CBC with platelets (Completed)    Ferritin (Completed)    Vitamin D deficiency        Relevant Orders    Vitamin D Deficiency (Completed)    Personal history of fall        Relevant Orders    Orthopedic  Referral    Right foot pain        Referral placed for a consultation with podiatry with persistent foot pain following injury several months ago; unclear diagnosis.     Relevant Orders    Orthopedic  Referral    Healthcare maintenance        Relevant Orders    Rubeola Antibody IgG (Completed)    Rubella Antibody IgG (Completed)    Mumps Immune Status, IgG (Completed)    Varicella Zoster Virus Antibody IgG (Completed)    Hepatitis B core antibody (Completed)        In regards to her vaccination status, I suggested starting with some blood work regarding MMR, varicella and hepatitis B vaccination status.  She likely had some vaccines back in her home country but we do not have access to those records.  I updated an Adacel vaccine at today's visit.    Julien Mauro is a 32 year old who presents today regarding a couple of concerns.  Her primary 1 is regarding a follow-up from a low hemoglobin level recently.  The patient has been taking over-the-counter multivitamin with iron daily.  She has not had issues with anemia previously.  The patient also has a complaint of generalized pain of her right foot.  She fell last fall and injured her foot and it continues to hurt and she is wondering what to do about it.  The patient states that she has never had any vaccinations since coming to the [place] many years ago what is any she may need.      Chief Complaint   Patient presents with     follow up low iron         Objective    /68 (BP Location:  Left arm, Patient Position: Left side, Cuff Size: Adult Large)   Pulse 75   Wt 86.6 kg (191 lb)   LMP 07/22/2022 (Approximate)   SpO2 99%   BMI 29.04 kg/m    Body mass index is 29.04 kg/m .  Physical Exam   GENERAL: healthy, alert and no distress  RIGHT FOOT: no sign of swelling or deformity            This note has been dictated using voice recognition software. Any grammatical or context distortions are unintentional and inherent to the software

## 2022-07-29 LAB
DEPRECATED CALCIDIOL+CALCIFEROL SERPL-MC: 71 UG/L (ref 20–75)
MEV IGG SER IA-ACNC: 99.1 AU/ML
MEV IGG SER IA-ACNC: POSITIVE
MUMPS ANTIBODY IGG INSTRUMENT VALUE: 22.6 AU/ML
MUV IGG SER QL IA: POSITIVE
RUBV IGG SERPL QL IA: 1.54 INDEX
RUBV IGG SERPL QL IA: POSITIVE
VZV IGG SER QL IA: 124.8 INDEX
VZV IGG SER QL IA: NORMAL

## 2022-07-30 LAB — HBV CORE AB SERPL QL IA: REACTIVE

## 2022-08-01 LAB
HBV CORE IGM SERPL QL IA: NONREACTIVE
HBV SURFACE AB SERPL IA-ACNC: 0 M[IU]/ML

## 2022-08-07 DIAGNOSIS — B18.1 CHRONIC VIRAL HEPATITIS B WITHOUT DELTA AGENT AND WITHOUT COMA (H): Primary | ICD-10-CM

## 2022-08-09 ENCOUNTER — OFFICE VISIT (OUTPATIENT)
Dept: PODIATRY | Facility: CLINIC | Age: 32
End: 2022-08-09
Attending: FAMILY MEDICINE
Payer: COMMERCIAL

## 2022-08-09 VITALS
HEIGHT: 66 IN | WEIGHT: 198 LBS | RESPIRATION RATE: 18 BRPM | OXYGEN SATURATION: 100 % | HEART RATE: 94 BPM | BODY MASS INDEX: 31.82 KG/M2 | TEMPERATURE: 98.4 F

## 2022-08-09 DIAGNOSIS — M72.2 PLANTAR FASCIITIS: Primary | ICD-10-CM

## 2022-08-09 PROCEDURE — 20550 NJX 1 TENDON SHEATH/LIGAMENT: CPT | Mod: RT | Performed by: PODIATRIST

## 2022-08-09 PROCEDURE — 99203 OFFICE O/P NEW LOW 30 MIN: CPT | Mod: 25 | Performed by: PODIATRIST

## 2022-08-09 RX ORDER — DEXAMETHASONE SODIUM PHOSPHATE 4 MG/ML
4 INJECTION, SOLUTION INTRA-ARTICULAR; INTRALESIONAL; INTRAMUSCULAR; INTRAVENOUS; SOFT TISSUE ONCE
Status: COMPLETED | OUTPATIENT
Start: 2022-08-09 | End: 2022-08-09

## 2022-08-09 RX ORDER — LIDOCAINE HYDROCHLORIDE 20 MG/ML
1 INJECTION, SOLUTION INFILTRATION; PERINEURAL ONCE
Status: COMPLETED | OUTPATIENT
Start: 2022-08-09 | End: 2022-08-09

## 2022-08-09 RX ORDER — IBUPROFEN 600 MG/1
600 TABLET, FILM COATED ORAL EVERY 8 HOURS PRN
Qty: 42 TABLET | Refills: 0 | Status: SHIPPED | OUTPATIENT
Start: 2022-08-09

## 2022-08-09 RX ADMIN — DEXAMETHASONE SODIUM PHOSPHATE 4 MG: 4 INJECTION, SOLUTION INTRA-ARTICULAR; INTRALESIONAL; INTRAMUSCULAR; INTRAVENOUS; SOFT TISSUE at 09:39

## 2022-08-09 RX ADMIN — LIDOCAINE HYDROCHLORIDE 1 ML: 20 INJECTION, SOLUTION INFILTRATION; PERINEURAL at 09:38

## 2022-08-09 ASSESSMENT — PAIN SCALES - GENERAL: PAINLEVEL: SEVERE PAIN (7)

## 2022-08-09 NOTE — PATIENT INSTRUCTIONS
What are Prescription Custom Orthotics?  Custom orthotics are specially-made devices designed to support and comfort your feet. Prescription orthotics are crafted for you and no one else. They match the contours of your feet precisely and are designed for the way you move. Orthotics are only manufactured after a podiatrist has conducted a complete evaluation of your feet, ankles, and legs, so the orthotic can accommodate your unique foot structure and pathology.  Prescription orthotics are divided into two categories:  Functional orthotics are designed to control abnormal motion. They may be used to treat foot pain caused by abnormal motion; they can also be used to treat injuries such as shin splints or tendinitis. Functional orthotics are usually crafted of a semi-rigid material such as plastic or graphite.  Accommodative orthotics are softer and meant to provide additional cushioning and support. They can be used to treat diabetic foot ulcers, painful calluses on the bottom of the foot, and other uncomfortable conditions.  Podiatrists use orthotics to treat foot problems such as plantar fasciitis, bursitis, tendinitis, diabetic foot ulcers, and foot, ankle, and heel pain. Clinical research studies have shown that podiatrist-prescribed foot orthotics decrease foot pain and improve function.  Orthotics typically cost more than shoe inserts purchased in a retail store, but the additional cost is usually well worth it. Unlike shoe inserts, orthotics are molded to fit each individual foot, so you can be sure that your orthotics fit and do what they're supposed to do. Prescription orthotics are also made of top-notch materials and last many years when cared for properly. Insurance often helps pay for prescription orthotics.  What are Shoe Inserts?   You've seen them at the grocery store and at the mall. You've probably even seen them on TV and online. Shoe inserts are any kind of non-prescription foot support designed  to be worn inside a shoe. Pre-packaged, mass produced, arch supports are shoe inserts. So are the  custom-made  insoles and foot supports that you can order online or at retail stores. Unless the device has been prescribed by a doctor and crafted for your specific foot, it's a shoe insert, not a custom orthotic device--despite what the ads might say.  Shoe inserts can be very helpful for a variety of foot ailments, including flat arches and foot and leg pain. They can cushion your feet, provide comfort, and support your arches, but they can't correct biomechanical foot problems or cure long-standing foot issues.  The most common types of shoe inserts are:  Arch supports: Some people have high arches. Others have low arches or flat feet. Arch supports generally have a  bumped-up  appearance and are designed to support the foot's natural arch.   Insoles: Insoles slip into your shoe to provide extra cushioning and support. Insoles are often made of gel, foam, or plastic.   Heel liners: Heel liners, sometimes called heel pads or heel cups, provide extra cushioning in the heel region. They may be especially useful for patients who have foot pain caused by age-related thinning of the heels' natural fat pads.   Foot cushions: Do your shoes rub against your heel or your toes? Foot cushions come in many different shapes and sizes and can be used as a barrier between you and your shoe.  Choosing an Over-the-Counter Shoe Insert  Selecting a shoe insert from the wide variety of devices on the market can be overwhelming. Here are some podiatrist-tested tips to help you find the insert that best meets your needs:  Consider your health. Do you have diabetes? Problems with circulation? An over-the-counter insert may not be your best bet. Diabetes and poor circulation increase your risk of foot ulcers and infections, so schedule an appointment with a podiatrist. He or she can help you select a solution that won't cause additional  health problems.   Think about the purpose. Are you planning to run a marathon, or do you just need a little arch support in your work shoes? Look for a product that fits your planned level of activity.   Bring your shoes. For the insert to be effective, it has to fit into your shoes. So bring your sneakers, dress shoes, or work boots--whatever you plan to wear with your insert. Look for an insert that will fit the contours of your shoe.   Try them on. If all possible, slip the insert into your shoe and try it out. Walk around a little. How does it feel? Don't assume that feelings of pressure will go away with continued wear. (If you can't try the inserts at the store, ask about the store's return policy and hold on to your receipt.)    Please call one of the [facility] locations below to schedule an appointment. If you received a prescription please bring it with you to your appointment. Some locations are limited to what they carry.    Office Locations    [facility]  [address] [place] [address]  Home Medical Equipment, [address]   Phone: [phone]   Orthotics and Prosthetics, [address]   Phone: [phone]    [facility]  [address]  [address]   [place] [address]   Phone: [phone]    [facility]ldg[facility]  [address]  [place] [address]  Phone: [phone]    [facility]ld[facility]   [address]  [place] [address]  Phone: [phone]    [facility]  [address]  [place] [address]  Phone: [phone]    [facility]  [address]  [place] [address]  Phone: [phone]    [place]   [address] [place] [address]   Phone: [phone]    WEARING YOUR CUSTOM FOOT ORTHOTICS   Most  insurance plans cover one pair of orthotics per year. You must check with your   insurance plan to see what your payment responsibility will be. Please call your   insurance company by calling the number on the back of your insurance card.   Orthotic's are non-refundable and non-returnable.   Orthotics are made of various designs. Some orthotics are covered with material that extends beyond your toes. If your orthotic is of this design, you will likely need to trim the toe end to get a proper fit. The insole from your shoe can be used as a template. Simply overlay the shoe insert on top of the custom orthotic. Align the heel end while tracing the length of the insert onto the custom orthotic. Use a large scissor to trim the toe end until you get a proper fit in the shoe.   The orthotic needs to be pushed as far back in the shoe as possible. The heel portion should not ride forward so as not to irritate your heel.   Orthotics are designed to work with socks. Excessive perspiration will shorten the life span of the orthotics. Remove the orthotic from the shoe frequently for proper drying.   The break-in period lasts for weeks. People new to orthotics will likely experience new aches and pains. The orthotic is forcing your foot into a new position. Arch, foot and leg muscle aches and fatigue are common during these weeks. Minor discomfort can be considered normal break in phenomenon. Start wearing your orthotic around your home your first day. Limited activity for one to two hours is recommended. You can increase one or two additional hours each day provided the aches and pains are subsiding. The degree of discomfort, fatigue and problems will dictate the speed of break in. You may require multiple weeks to work up to full time use.   Do not continue wearing your orthotics if they are creating problems such as blisters or sores. Do not hesitate to call the clinic to speak with a nurse regarding orthotic   break in,  fit, trimming, etc. You may also need to see the doctor if the orthotics are   simply not working out. Adjustments are sometimes made to improve orthotic   function.     Orthotics will only work in certain styles and types of shoes. Orthotics rarely work in dress shoes. Slip-ons, clogs, sandals and heels are particularly troublesome. Specially designed orthotics may be necessary for these types of shoes. Your custom orthotic was designed for activities that require appropriate walking or running shoes. Lace up athletic shoes, walking shoes or work boots should work appropriately. You may need a wider or longer shoe. Shoes with a removable  or insert work best. In general, you want to remove an insert from the shoe before placing the orthotic into the shoe. Shoes without a removable liner may not work as well.     When purchasing new shoes, bring your orthotics along to get a proper fit. Shop at stores that are familiar with orthotics.   Frequent washing of the orthotic may shorten the life span of the top cover. The top cover can be replaced but will generally last one to five years depending on use and foot perspiration.

## 2022-08-09 NOTE — NURSING NOTE
Had a fall last year while shopping and injured right foot. She was 36 weeks pregnant at that time. Did physical therapy and not improving. Does not wear orthotics.

## 2022-08-09 NOTE — LETTER
8/9/2022         RE: Nj Capellan  [address] [facility] [address]        Dear Colleague,    Thank you for referring your patient, Nj Capellan, to the [facility]. Please see a copy of my visit note below.    FOOT AND ANKLE SURGERY/PODIATRY CONSULT NOTE         ASSESSMENT:   Plantar fasciitis right foot      TREATMENT:  The patient was given a cortisone injection in the right heel today consisting of 1 cc of dexamethasone sodium phosphate and 1 cc of 2% lidocaine plain.  I have also recommended prescription orthotics.  The patient was started on ibuprofen 600 mg 1 tab 3 times daily.  The patient is to return to the clinic in 1 week if her pain persist at which time I would recommend a second cortisone injection.        HPI: I was asked to see Nj Capellan today to evaluate and treat right foot pain.  The patient stated that the pain is located around the right heel.  She stated that she had an injury in October of last year.  She has had x-rays taken.  X-rays of her foot and ankle were both negative for any fractures or dislocations.  She stated that in the mornings her pain is quite severe.  She has to stand for several minutes before she can begin walking.  She has not had any recent redness or swelling.  The pain is aggravated with prolonged weightbearing and ambulation.  The pain is relieved with nonweightbearing.  She denies any other previous treatment other than physical therapy.      History reviewed. No pertinent past medical history.    Social History     Socioeconomic History     Marital status:      Spouse name: Not on file     Number of children: Not on file     Years of education: Not on file     Highest education level: Not on file   Occupational History     Not on file   Tobacco Use     Smoking status: Never Smoker     Smokeless tobacco: Never Used   Substance and Sexual Activity     Alcohol use: Never     Drug use: Never     Sexual activity: Not on  file   Other Topics Concern     Not on file   Social History Narrative     Not on file     Social Determinants of Health     Financial Resource Strain: Not on file   Food Insecurity: Not on file   Transportation Needs: Not on file   Physical Activity: Not on file   Stress: Not on file   Social Connections: Not on file   Intimate Partner Violence: Not on file   Housing Stability: Not on file        No Known Allergies       Current Outpatient Medications:      Multiple Vitamins-Minerals (MULTIVITAMIN WOMEN PO), , Disp: , Rfl:      Vitamin D3 (CHOLECALCIFEROL) 25 mcg (1000 units) tablet, Take 1 tablet by mouth daily, Disp: , Rfl:      Family History   Problem Relation Age of Onset     No Known Problems Daughter         Social History     Socioeconomic History     Marital status:      Spouse name: Not on file     Number of children: Not on file     Years of education: Not on file     Highest education level: Not on file   Occupational History     Not on file   Tobacco Use     Smoking status: Never Smoker     Smokeless tobacco: Never Used   Substance and Sexual Activity     Alcohol use: Never     Drug use: Never     Sexual activity: Not on file   Other Topics Concern     Not on file   Social History Narrative     Not on file     Social Determinants of Health     Financial Resource Strain: Not on file   Food Insecurity: Not on file   Transportation Needs: Not on file   Physical Activity: Not on file   Stress: Not on file   Social Connections: Not on file   Intimate Partner Violence: Not on file   Housing Stability: Not on file        Review of Systems - Patient denies fever, chills, rash, wound, stiffness, limping, numbness, weakness, heart burn, blood in stool, chest pain with activity, calf pain when walking, shortness of breath with activity, chronic cough, easy bleeding/bruising, swelling of ankles, excessive thirst, fatigue, depression, anxiety.  Patient admits to right heel pain.      OBJECTIVE:  Appearance:  "alert, well appearing, and in no distress.    Pulse 94   Temp 98.4  F (36.9  C)   Resp 18   Ht 1.676 m (5' 6\")   Wt 89.8 kg (198 lb)   LMP 07/22/2022 (Approximate)   SpO2 100%   BMI 31.96 kg/m       Body mass index is 31.96 kg/m .     General appearance: Patient is alert and fully cooperative with history & exam.  No sign of distress is noted during the visit.  Psychiatric: Affect is pleasant & appropriate.  Patient appears motivated to improve health.  Respiratory: Breathing is regular & unlabored while sitting.  HEENT: Hearing is intact to spoken word.  Speech is clear.  No gross evidence of visual impairment that would impact ambulation.    Vascular: Dorsalis pedis and posterior tibial pulses are palpable. There is no pedal hair growth bilaterally.  CFT < 3 sec from anterior tibial surface to distal digits bilaterally. There is no appreciable edema noted.  Dermatologic: Turgor and texture are within normal limits. No coloration or temperature changes. No primary or secondary lesions noted.  Neurologic: All epicritic and proprioceptive sensations are grossly intact bilaterally.  Musculoskeletal: All active and passive ankle, subtalar, midtarsal, and 1st MPJ range of motion are grossly intact without pain or crepitus, with the exception of none. Manual muscle strength is within normal limits bilaterally. All dorsiflexors, plantarflexors, invertors, evertors are intact bilaterally. Tenderness present to the plantar medial aspect of the right heel on palpation.  No tenderness to the right foot or ankle with range of motion. Calf is soft/non-tender without warmth/induration    Imaging:       No images are attached to the encounter or orders placed in the encounter.     No results found.   No results found.       Enrrique Gutierrez DPM  [facility] Foot & Ankle Surgery/Podiatry         Again, thank you for allowing me to participate in the care of your patient.        Sincerely,        Enrrique Ward DPM    "

## 2022-08-09 NOTE — PROGRESS NOTES
FOOT AND ANKLE SURGERY/PODIATRY CONSULT NOTE         ASSESSMENT:   Plantar fasciitis right foot      TREATMENT:  The patient was given a cortisone injection in the right heel today consisting of 1 cc of dexamethasone sodium phosphate and 1 cc of 2% lidocaine plain.  I have also recommended prescription orthotics.  The patient was started on ibuprofen 600 mg 1 tab 3 times daily.  The patient is to return to the clinic in 1 week if her pain persist at which time I would recommend a second cortisone injection.        HPI: I was asked to see Nj Capellan today to evaluate and treat right foot pain.  The patient stated that the pain is located around the right heel.  She stated that she had an injury in October of last year.  She has had x-rays taken.  X-rays of her foot and ankle were both negative for any fractures or dislocations.  She stated that in the mornings her pain is quite severe.  She has to stand for several minutes before she can begin walking.  She has not had any recent redness or swelling.  The pain is aggravated with prolonged weightbearing and ambulation.  The pain is relieved with nonweightbearing.  She denies any other previous treatment other than physical therapy.      History reviewed. No pertinent past medical history.    Social History     Socioeconomic History     Marital status:      Spouse name: Not on file     Number of children: Not on file     Years of education: Not on file     Highest education level: Not on file   Occupational History     Not on file   Tobacco Use     Smoking status: Never Smoker     Smokeless tobacco: Never Used   Substance and Sexual Activity     Alcohol use: Never     Drug use: Never     Sexual activity: Not on file   Other Topics Concern     Not on file   Social History Narrative     Not on file     Social Determinants of Health     Financial Resource Strain: Not on file   Food Insecurity: Not on file   Transportation Needs: Not on file   Physical Activity:  "Not on file   Stress: Not on file   Social Connections: Not on file   Intimate Partner Violence: Not on file   Housing Stability: Not on file        No Known Allergies       Current Outpatient Medications:      Multiple Vitamins-Minerals (MULTIVITAMIN WOMEN PO), , Disp: , Rfl:      Vitamin D3 (CHOLECALCIFEROL) 25 mcg (1000 units) tablet, Take 1 tablet by mouth daily, Disp: , Rfl:      Family History   Problem Relation Age of Onset     No Known Problems Daughter         Social History     Socioeconomic History     Marital status:      Spouse name: Not on file     Number of children: Not on file     Years of education: Not on file     Highest education level: Not on file   Occupational History     Not on file   Tobacco Use     Smoking status: Never Smoker     Smokeless tobacco: Never Used   Substance and Sexual Activity     Alcohol use: Never     Drug use: Never     Sexual activity: Not on file   Other Topics Concern     Not on file   Social History Narrative     Not on file     Social Determinants of Health     Financial Resource Strain: Not on file   Food Insecurity: Not on file   Transportation Needs: Not on file   Physical Activity: Not on file   Stress: Not on file   Social Connections: Not on file   Intimate Partner Violence: Not on file   Housing Stability: Not on file        Review of Systems - Patient denies fever, chills, rash, wound, stiffness, limping, numbness, weakness, heart burn, blood in stool, chest pain with activity, calf pain when walking, shortness of breath with activity, chronic cough, easy bleeding/bruising, swelling of ankles, excessive thirst, fatigue, depression, anxiety.  Patient admits to right heel pain.      OBJECTIVE:  Appearance: alert, well appearing, and in no distress.    Pulse 94   Temp 98.4  F (36.9  C)   Resp 18   Ht 1.676 m (5' 6\")   Wt 89.8 kg (198 lb)   LMP 07/22/2022 (Approximate)   SpO2 100%   BMI 31.96 kg/m       Body mass index is 31.96 kg/m .     General " appearance: Patient is alert and fully cooperative with history & exam.  No sign of distress is noted during the visit.  Psychiatric: Affect is pleasant & appropriate.  Patient appears motivated to improve health.  Respiratory: Breathing is regular & unlabored while sitting.  HEENT: Hearing is intact to spoken word.  Speech is clear.  No gross evidence of visual impairment that would impact ambulation.    Vascular: Dorsalis pedis and posterior tibial pulses are palpable. There is no pedal hair growth bilaterally.  CFT < 3 sec from anterior tibial surface to distal digits bilaterally. There is no appreciable edema noted.  Dermatologic: Turgor and texture are within normal limits. No coloration or temperature changes. No primary or secondary lesions noted.  Neurologic: All epicritic and proprioceptive sensations are grossly intact bilaterally.  Musculoskeletal: All active and passive ankle, subtalar, midtarsal, and 1st MPJ range of motion are grossly intact without pain or crepitus, with the exception of none. Manual muscle strength is within normal limits bilaterally. All dorsiflexors, plantarflexors, invertors, evertors are intact bilaterally. Tenderness present to the plantar medial aspect of the right heel on palpation.  No tenderness to the right foot or ankle with range of motion. Calf is soft/non-tender without warmth/induration    Imaging:       No images are attached to the encounter or orders placed in the encounter.     No results found.   No results found.       Enrrique Gutierrez DPM  [facility] Foot & Ankle Surgery/Podiatry

## 2022-08-10 ENCOUNTER — TELEPHONE (OUTPATIENT)
Dept: FAMILY MEDICINE | Facility: CLINIC | Age: 32
End: 2022-08-10

## 2022-08-10 ENCOUNTER — HOSPITAL ENCOUNTER (OUTPATIENT)
Dept: ULTRASOUND IMAGING | Facility: HOSPITAL | Age: 32
Discharge: HOME | End: 2022-08-10
Attending: FAMILY MEDICINE | Admitting: FAMILY MEDICINE
Payer: COMMERCIAL

## 2022-08-10 ENCOUNTER — LAB (OUTPATIENT)
Dept: LAB | Facility: CLINIC | Age: 32
End: 2022-08-10
Payer: COMMERCIAL

## 2022-08-10 DIAGNOSIS — B18.1 CHRONIC VIRAL HEPATITIS B WITHOUT DELTA AGENT AND WITHOUT COMA (H): ICD-10-CM

## 2022-08-10 LAB
ALBUMIN SERPL BCG-MCNC: 4.2 G/DL (ref 3.5–5.2)
ALP SERPL-CCNC: 85 U/L (ref 35–104)
ALT SERPL W P-5'-P-CCNC: 11 U/L (ref 10–35)
AST SERPL W P-5'-P-CCNC: 25 U/L (ref 10–35)
BILIRUB DIRECT SERPL-MCNC: <0.2 MG/DL (ref 0–0.3)
BILIRUB SERPL-MCNC: 0.4 MG/DL
PROT SERPL-MCNC: 8.5 G/DL (ref 6.4–8.3)

## 2022-08-10 PROCEDURE — 76705 ECHO EXAM OF ABDOMEN: CPT

## 2022-08-10 PROCEDURE — 80076 HEPATIC FUNCTION PANEL: CPT

## 2022-08-10 PROCEDURE — 36415 COLL VENOUS BLD VENIPUNCTURE: CPT

## 2022-08-10 NOTE — RESULT ENCOUNTER NOTE
Call pt - her ultrasound shows gallstones.  I recommend she make a follow-up with Dr. Mane or her primary to discuss.

## 2022-08-10 NOTE — TELEPHONE ENCOUNTER
----- Message from Debra Olea MD sent at 8/10/2022 12:23 PM CDT -----  Call pt - her ultrasound shows gallstones.  I recommend she make a follow-up with Dr. Mane or her primary to discuss.

## 2022-08-10 NOTE — TELEPHONE ENCOUNTER
Left message to call back for:results  Information to relay to patient: see below and relay  Also schedule follow up

## 2022-08-16 ENCOUNTER — HOSPITAL ENCOUNTER (EMERGENCY)
Facility: CLINIC | Age: 32
Discharge: HOME | End: 2022-08-16
Attending: EMERGENCY MEDICINE | Admitting: EMERGENCY MEDICINE
Payer: COMMERCIAL

## 2022-08-16 VITALS
WEIGHT: 185 LBS | SYSTOLIC BLOOD PRESSURE: 167 MMHG | DIASTOLIC BLOOD PRESSURE: 110 MMHG | TEMPERATURE: 98.1 F | OXYGEN SATURATION: 100 % | BODY MASS INDEX: 29.73 KG/M2 | RESPIRATION RATE: 17 BRPM | HEART RATE: 100 BPM | HEIGHT: 66 IN

## 2022-08-16 DIAGNOSIS — M54.6 ACUTE MIDLINE THORACIC BACK PAIN: ICD-10-CM

## 2022-08-16 PROCEDURE — 99283 EMERGENCY DEPT VISIT LOW MDM: CPT

## 2022-08-16 RX ORDER — HYDROCORTISONE 25 MG/G
CREAM TOPICAL
Qty: 30 G | Refills: 0 | Status: SHIPPED | OUTPATIENT
Start: 2022-08-16 | End: 2022-10-14

## 2022-08-16 RX ORDER — CYCLOBENZAPRINE HCL 10 MG
10 TABLET ORAL 3 TIMES DAILY PRN
Qty: 20 TABLET | Refills: 0 | Status: SHIPPED | OUTPATIENT
Start: 2022-08-16 | End: 2022-08-22

## 2022-08-16 ASSESSMENT — ENCOUNTER SYMPTOMS
RESPIRATORY NEGATIVE: 1
NEUROLOGICAL NEGATIVE: 1
BACK PAIN: 1
CARDIOVASCULAR NEGATIVE: 1
ENDOCRINE NEGATIVE: 1
NECK STIFFNESS: 0
NECK PAIN: 0
CONSTITUTIONAL NEGATIVE: 1
MYALGIAS: 0
GASTROINTESTINAL NEGATIVE: 1
JOINT SWELLING: 0

## 2022-08-16 NOTE — ED NOTES
Pt refused Xray stated to tech would see PMD and do it then, MD updated.    Mercedes Flap Text: The defect edges were debeveled with a #15 scalpel blade.  Given the location of the defect, shape of the defect and the proximity to free margins a Mercedes flap was deemed most appropriate.  Using a sterile surgical marker, an appropriate advancement flap was drawn incorporating the defect and placing the expected incisions within the relaxed skin tension lines where possible. The area thus outlined was incised deep to adipose tissue with a #15 scalpel blade.  The skin margins were undermined to an appropriate distance in all directions utilizing iris scissors.

## 2022-08-16 NOTE — DISCHARGE INSTRUCTIONS
Recommend Flexeril 3 times a day as needed for spasming and stiffness in her back.  You can use this in addition to ibuprofen every 8 hours for pain.  You can also consider over-the-counter lidocaine patches.  Heating pads may also help your back.

## 2022-08-16 NOTE — ED PROVIDER NOTES
"Emergency Department Midlevel Supervisory Note     I personally saw the patient and performed a substantive portion of the visit including all aspects of the medical decision making.    ED Course:  3:24 PM [facility] Resident Александр Mcelroy DO, staffed patient with me. I agree with their assessment and plan of management, and I will see the patient.  3:35 PM I met with the patient to introduce myself, gather additional history, perform my initial exam, and discuss the plan. PPE: Provider wore gloves, N95 mask.       Brief HPI:     Nj Capellan is a 32 year old female who presents for evaluation of back pain.     The patient presents with complaints of back pain that started 3 weeks ago. Pain was atraumatic and she is currently able to twist without provoking. Pain is localized to thoracic spine area. Pain does not radiate. Describes pain as a 9/10 (accompanied with crying) earlier today, but is currently at a 5-6/10. She does not think it is muscular-related pain.     No report of numbness, tinglyness, urinary/fecal incontinence, saddle anesthesia, and no other medical concerns or complaints at this time.     I, Mane Rey, am serving as a scribe to document services personally performed by Lucía Yeung MD, based on my observations and the provider's statements to me.   I, Lucía Yeung MD, attest that Mane Rey was acting in a scribe capacity, has observed my performance of the services and has documented them in accordance with my direction.    Brief Physical Exam: BP (!) 167/110   Pulse 100   Temp 98.1  F (36.7  C) (Oral)   Resp 17   Ht 1.676 m (5' 6\")   Wt 83.9 kg (185 lb)   LMP 07/22/2022 (Approximate)   SpO2 100%   Breastfeeding No   BMI 29.86 kg/m    Constitutional:  Alert, in no acute distress  Musculoskeletal:  No edema. No cyanosis. Range of motion major extremities intact. Ambulatory. Tenderness in lower thoracic spine.  Neurologic:  Alert and oriented x3, " no focal deficits noted. Strength and sensation intact in bilateral lower extremities.     MDM:  Well-appearing, nontoxic, ambulatory 32-year-old here with thoracic back pain.  She has no red flags for cauda equina syndrome or epidural abscess.  I suspect musculoskeletal etiology and this was atraumatic.  No CVA symptoms to suggest urinary pathology.  Patient does have several other complaints and it sounds like she has a hemorrhoid and is kind of been sitting abnormally.  This may have caused some thoracic strain.  Patient is specifically requesting x-rays so we will complete this and I have a low suspicion for any fracture of the spine.  If negative, plan will be to discharge with Flexeril to add to her ibuprofen regimen, PCP follow-up which she already has scheduled on the 24th, and Anusol at her request for the hemorrhoid.       1. Acute midline thoracic back pain        Labs and Imaging:     I have reviewed the relevant laboratory and radiology studies    Lucía Yeung MD  [facility] EMERGENCY ROOM  [facility] [facility] [address] [phone]       Lucía Yeung MD  08/16/22 8895

## 2022-08-16 NOTE — ED PROVIDER NOTES
EMERGENCY DEPARTMENT ENCOUNTER      NAME: Nj Capellan  AGE: 32 year old female  YOB: 1990  MRN: [id number]  EVALUATION DATE & TIME: 8/16/2022  3:22 PM    PCP: Norma Orantes    ED PROVIDER: Александр Mcelroy DO      No chief complaint on file.      FINAL IMPRESSION:  1. Acute midline thoracic back pain          ED COURSE & MEDICAL DECISION MAKING:    Pertinent Labs & Imaging studies reviewed. (See chart for details)  32 year old female presents to the Emergency Department for evaluation of back pain.  No noted trauma, denies neurologic symptoms and no incontinence. No history of IV drug abuse and negative for fevers. Exam unremarkable other than tenderness over mid thoracic spine.  Thoracic x-ray pending at time of signout, if negative plan to discharge patient with Flexeril, as believe it is likely musculoskeletal. Will  Patient then to follow-up with primary care provider.      At the conclusion of the encounter I discussed the results of all of the tests and the disposition. The questions were answered. The patient or family acknowledged understanding and was agreeable with the care plan.     MEDICATIONS GIVEN IN THE EMERGENCY:  Medications - No data to display    NEW PRESCRIPTIONS STARTED AT TODAY'S ER VISIT  New Prescriptions    CYCLOBENZAPRINE (FLEXERIL) 10 MG TABLET    Take 1 tablet (10 mg) by mouth 3 times daily as needed for muscle spasms          =================================================================    HPI    Patient information was obtained from: Patient    Nj Capellan is a 32 year old female with a pertinent history of diverticulitis, hepatitis B who presents to this ED  for evaluation of back pain.  Reports a history of 3 weeks of midline thoracic back pain.  No traumatic event.  Denies any neurologic symptoms including numbness, tingling, burning.  Denies saddle anesthesia, bladder or bowel incontinence.  She does note that she has recent history of painful hemorrhoids, so she  "may be sitting abnormally during work.  She states that the pain is \"directly on the spine\".  She denies it radiates anywhere.  Denies any fevers or chills.      REVIEW OF SYSTEMS   Review of Systems   Constitutional: Negative.    HENT: Negative.    Respiratory: Negative.    Cardiovascular: Negative.    Gastrointestinal: Negative.    Endocrine: Negative.    Genitourinary: Negative.    Musculoskeletal: Positive for back pain. Negative for gait problem, joint swelling, myalgias, neck pain and neck stiffness.   Skin: Negative.    Neurological: Negative.         PAST MEDICAL HISTORY:  No past medical history on file.    PAST SURGICAL HISTORY:  Past Surgical History:   Procedure Laterality Date      SECTION       CURRENT MEDICATIONS:    cyclobenzaprine (FLEXERIL) 10 MG tablet  ibuprofen (ADVIL/MOTRIN) 600 MG tablet  Multiple Vitamins-Minerals (MULTIVITAMIN WOMEN PO)  Vitamin D3 (CHOLECALCIFEROL) 25 mcg (1000 units) tablet    ALLERGIES:  No Known Allergies    FAMILY HISTORY:  Family History   Problem Relation Age of Onset     No Known Problems Daughter        SOCIAL HISTORY:   Social History     Socioeconomic History     Marital status:    Tobacco Use     Smoking status: Never Smoker     Smokeless tobacco: Never Used   Substance and Sexual Activity     Alcohol use: Never     Drug use: Never       VITALS:  BP (!) 167/110   Pulse 100   Temp 98.1  F (36.7  C) (Oral)   Resp 17   Ht 1.676 m (5' 6\")   Wt 83.9 kg (185 lb)   LMP 2022 (Approximate)   SpO2 100%   Breastfeeding No   BMI 29.86 kg/m      PHYSICAL EXAM    General: alert, appears comfortable, no acute distress  HEENT: atraumatic, conjunctiva clear without erythema, EOM's intact, no nasal discharge  Neck: supple  Cardiac: RRR w/o audible murmur  Resp: bilateral clear lungs w/o wheezing, crackles or rhonchi; breathing comfortably on RA  Abdomen: soft, non-tender to palpation, no masses. BS normal  Extremities: no peripheral edema  MSK: " Tenderness to palpation over T7-T9.  No paraspinal tenderness.  No cervical or lumbar tenderness.  Patient with normal gait.  Normal range of motion in the spine.  No warmness, redness, erythema.  Skin: no rashes or suspicious legions on exposed skin  Neuro: grossly normal CN; normal strength, sensation, & tone  Psych: affect congruent with mood       LAB:  All pertinent labs reviewed and interpreted.       RADIOLOGY:  Reviewed all pertinent imaging. Please see official radiology report.  XR Thoracic Spine 2 Views    (Results Pending)       Александр Mcelroy DO  [facility] EMERGENCY ROOM  [address] [place] [id number] [phone]     Александр Mcelroy DO  Resident  08/16/22 1544       Александр Mcelroy DO  Resident  08/16/22 5322

## 2022-08-16 NOTE — ED TRIAGE NOTES
Patient presents to the ED with complaints of left sided neck discomfort and upper back pains for the last 3 weeks unrelieved by Ibuprofen.

## 2022-08-16 NOTE — ED NOTES
"EMERGENCY DEPARTMENT SIGN OUT NOTE        ED COURSE AND MEDICAL DECISION MAKING  Patient was signed out to me by Dr Lucía Darling at 4:00 PM    In brief, Nj Capellan is a 32 year old female who initially presented for evaluation of back pain. Reports midline thoracic back pain for the past 3 weeks. Denies neurologic symptoms. Patient reports a history of painful hemorrhoids so may be sitting abnormally at work. Pain is \"directly on the spine\" with no radiation.      At time of sign out, disposition was pending x-ray of the thoracic spine.    Previous provider thought most likely muscle skeletal but at patient's request x-ray was ordered.  Patient now declining x-ray which seems reasonable since this is nontraumatic back pain.  Will prescribe Flexeril for muscle spasming, recommend ibuprofen, lidocaine patches, heat, and follow-up with her primary care doctor as scheduled on the 24th.  As a external hemorrhoid and Anusol was prescribed by the outgoing provider.    I met with patient and patient agrees with this plan.     4:31 PM Notified by nursing staff the patient is refusing the x-ray and prefers to get imaging through her primary care office. Will discuss plan for discharge with the patient.  4:38 PM Discussed plan for discharge with patient.         FINAL IMPRESSION    1. Acute midline thoracic back pain        ED MEDS  Medications - No data to display    LAB  Labs Ordered and Resulted from Time of ED Arrival to Time of ED Departure - No data to display      RADIOLOGY    XR Thoracic Spine 2 Views    (Results Pending)       DISCHARGE MEDS  New Prescriptions    CYCLOBENZAPRINE (FLEXERIL) 10 MG TABLET    Take 1 tablet (10 mg) by mouth 3 times daily as needed for muscle spasms    HYDROCORTISONE, PERIANAL, (ANUSOL-HC) 2.5 % CREAM    Apply to rectum as needed.       Red Ac MD  [facility] EMERGENCY ROOM  [facility] [facility] [address] [phone]     Red Ac " MD SVETLANA  08/16/22 [id number]

## 2022-08-21 NOTE — TELEPHONE ENCOUNTER
RECORDS RECEIVED FROM: [facility]   [facility] Date: 09.06.2022   NOTES STATUS DETAILS   OFFICE NOTE from referring provider Internal 08.07.2022 Nalini Mane   OFFICE NOTES from other specialists Internal  08.27.2021 Tonya Douglass DO   DISCHARGE SUMMARY from hospital     MEDICATION LIST Internal    LIVER BIOSPY (IF APPLICABLE)      PATHOLOGY REPORTS      IMAGING     ENDOSCOPY (IF AVAILABLE)     COLONOSCOPY (IF AVAILABLE)     ULTRASOUND LIVER Internal 08.10.2022 US ABDOMEN LIMITED   CT OF ABDOMEN     MRI OF LIVER     FIBROSCAN, US ELASTOGRAPHY, FIBROSIS SCAN, MR ELASTOGRAPHY     LABS     HEPATIC PANEL (LIVER PANEL) Internal 08.10.2022   BASIC METABOLIC PANEL Internal 09.06.2019   COMPLETE METABOLIC PANEL Internal 07.28.2022   COMPLETE BLOOD COUNT (CBC) Internal 07.28.2022   INTERNATIONAL NORMALIZED RATIO (INR)     HEPATITIS C ANTIBODY     HEPATITIS C VIRAL LOAD/PCR     HEPATITIS C GENOTYPE     HEPATITIS B SURFACE ANTIGEN     HEPATITIS B SURFACE ANTIBODY Internal 07.28.2022   HEPATITIS B DNA QUANT LEVEL     HEPATITIS B CORE ANTIBODY Internal 07.28.2022

## 2022-09-06 ENCOUNTER — PRE VISIT (OUTPATIENT)
Dept: GASTROENTEROLOGY | Facility: CLINIC | Age: 32
End: 2022-09-06

## 2022-09-06 DIAGNOSIS — B18.1 CHRONIC VIRAL HEPATITIS B WITHOUT DELTA AGENT AND WITHOUT COMA (H): Primary | ICD-10-CM

## 2022-10-01 ENCOUNTER — HEALTH MAINTENANCE LETTER (OUTPATIENT)
Age: 32
End: 2022-10-01

## 2022-10-06 ENCOUNTER — OFFICE VISIT (OUTPATIENT)
Dept: PODIATRY | Facility: CLINIC | Age: 32
End: 2022-10-06
Payer: COMMERCIAL

## 2022-10-06 VITALS — HEART RATE: 93 BPM | WEIGHT: 185 LBS | HEIGHT: 66 IN | OXYGEN SATURATION: 100 % | BODY MASS INDEX: 29.73 KG/M2

## 2022-10-06 DIAGNOSIS — M21.6X1 PLANTAR FLEXED METATARSAL BONE OF RIGHT FOOT: Primary | ICD-10-CM

## 2022-10-06 DIAGNOSIS — M77.41 METATARSALGIA OF RIGHT FOOT: ICD-10-CM

## 2022-10-06 PROCEDURE — 99213 OFFICE O/P EST LOW 20 MIN: CPT | Performed by: PODIATRIST

## 2022-10-06 ASSESSMENT — PAIN SCALES - GENERAL: PAINLEVEL: EXTREME PAIN (8)

## 2022-10-06 NOTE — LETTER
10/6/2022         RE: Nj Capellan  [address] [facility] [address]        Dear Colleague,    Thank you for referring your patient, Nj Capellan, to the [facility]. Please see a copy of my visit note below.    FOOT AND ANKLE SURGERY/PODIATRY Progress Note        ASSESSMENT:   Plantarflexed second metatarsal right foot  Metatarsal head right foot        TREATMENT: I have recommended orthotics with a metatarsal raise.  The patient is to return to clinic as needed.     HPI: Walter Capellan return to the clinic complain of pain on the ball of her right foot.  The patient stated that she has had this pain for several weeks.  It is a sharp pain which is aggravated by weightbearing and ambulation.  The pain is near the second metatarsal phalangeal joint.  She denies trauma to the right foot.  She has not had any associated redness or swelling.  The pain is relieved with nonweightbearing.      No past medical history on file.     Past Surgical History:   Procedure Laterality Date      SECTION         No Known Allergies      Current Outpatient Medications:      hydrocortisone, Perianal, (ANUSOL-HC) 2.5 % cream, Apply to rectum as needed., Disp: 30 g, Rfl: 0     ibuprofen (ADVIL/MOTRIN) 600 MG tablet, Take 1 tablet (600 mg) by mouth every 8 hours as needed for moderate pain, Disp: 42 tablet, Rfl: 0     Multiple Vitamins-Minerals (MULTIVITAMIN WOMEN PO), , Disp: , Rfl:      Vitamin D3 (CHOLECALCIFEROL) 25 mcg (1000 units) tablet, Take 1 tablet by mouth daily, Disp: , Rfl:     Family History   Problem Relation Age of Onset     No Known Problems Daughter        Social History     Socioeconomic History     Marital status:      Spouse name: Not on file     Number of children: Not on file     Years of education: Not on file     Highest education level: Not on file   Occupational History     Not on file   Tobacco Use     Smoking status: Never Smoker     Smokeless tobacco:  Never Used   Substance and Sexual Activity     Alcohol use: Never     Drug use: Never     Sexual activity: Not on file   Other Topics Concern     Not on file   Social History Narrative     Not on file     Social Determinants of Health     Financial Resource Strain: Not on file   Food Insecurity: Not on file   Transportation Needs: Not on file   Physical Activity: Not on file   Stress: Not on file   Social Connections: Not on file   Intimate Partner Violence: Not on file   Housing Stability: Not on file       10 point Review of Systems is negative      There were no vitals taken for this visit.    BMI= There is no height or weight on file to calculate BMI.    OBJECTIVE:  General appearance: Patient is alert and fully cooperative with history & exam.  No sign of distress is noted during the visit.  Vascular: Dorsalis pedis and posterior tibial pulses are palpable. There is no pedal hair growth bilaterally.  CFT < 3 sec from anterior tibial surface to distal digits bilaterally. There is no appreciable edema noted.  Dermatologic: Turgor and texture are within normal limits. No coloration or temperature changes. No primary or secondary lesions noted.  Neurologic: All epicritic and proprioceptive sensations are grossly intact bilaterally.  Negative Mark sign third intermetatarsal space right foot.  Musculoskeletal: All active and passive ankle, subtalar, midtarsal, and 1st MPJ range of motion are grossly intact without pain or crepitus, with the exception of none. Manual muscle strength is within normal limits bilaterally. All dorsiflexors, plantarflexors, invertors, evertors are intact bilaterally. Tenderness present to the second metatarsal phalangeal joint right foot on palpation. Tenderness to the second metatarsal phalangeal joint right foot with range of motion. Calf is soft/non-tender without warmth/induration       Imaging:         No results found.         Enrrique Ward; GRICEL  [facility] Foot & Ankle  Surgery/Podiatry         Again, thank you for allowing me to participate in the care of your patient.        Sincerely,        Enrrique Ward DPM

## 2022-10-06 NOTE — PATIENT INSTRUCTIONS
What are Prescription Custom Orthotics?  Custom orthotics are specially-made devices designed to support and comfort your feet. Prescription orthotics are crafted for you and no one else. They match the contours of your feet precisely and are designed for the way you move. Orthotics are only manufactured after a podiatrist has conducted a complete evaluation of your feet, ankles, and legs, so the orthotic can accommodate your unique foot structure and pathology.  Prescription orthotics are divided into two categories:  Functional orthotics are designed to control abnormal motion. They may be used to treat foot pain caused by abnormal motion; they can also be used to treat injuries such as shin splints or tendinitis. Functional orthotics are usually crafted of a semi-rigid material such as plastic or graphite.  Accommodative orthotics are softer and meant to provide additional cushioning and support. They can be used to treat diabetic foot ulcers, painful calluses on the bottom of the foot, and other uncomfortable conditions.  Podiatrists use orthotics to treat foot problems such as plantar fasciitis, bursitis, tendinitis, diabetic foot ulcers, and foot, ankle, and heel pain. Clinical research studies have shown that podiatrist-prescribed foot orthotics decrease foot pain and improve function.  Orthotics typically cost more than shoe inserts purchased in a retail store, but the additional cost is usually well worth it. Unlike shoe inserts, orthotics are molded to fit each individual foot, so you can be sure that your orthotics fit and do what they're supposed to do. Prescription orthotics are also made of top-notch materials and last many years when cared for properly. Insurance often helps pay for prescription orthotics.  What are Shoe Inserts?   You've seen them at the grocery store and at the mall. You've probably even seen them on TV and online. Shoe inserts are any kind of non-prescription foot support designed  to be worn inside a shoe. Pre-packaged, mass produced, arch supports are shoe inserts. So are the  custom-made  insoles and foot supports that you can order online or at retail stores. Unless the device has been prescribed by a doctor and crafted for your specific foot, it's a shoe insert, not a custom orthotic device--despite what the ads might say.  Shoe inserts can be very helpful for a variety of foot ailments, including flat arches and foot and leg pain. They can cushion your feet, provide comfort, and support your arches, but they can't correct biomechanical foot problems or cure long-standing foot issues.  The most common types of shoe inserts are:  Arch supports: Some people have high arches. Others have low arches or flat feet. Arch supports generally have a  bumped-up  appearance and are designed to support the foot's natural arch.   Insoles: Insoles slip into your shoe to provide extra cushioning and support. Insoles are often made of gel, foam, or plastic.   Heel liners: Heel liners, sometimes called heel pads or heel cups, provide extra cushioning in the heel region. They may be especially useful for patients who have foot pain caused by age-related thinning of the heels' natural fat pads.   Foot cushions: Do your shoes rub against your heel or your toes? Foot cushions come in many different shapes and sizes and can be used as a barrier between you and your shoe.  Choosing an Over-the-Counter Shoe Insert  Selecting a shoe insert from the wide variety of devices on the market can be overwhelming. Here are some podiatrist-tested tips to help you find the insert that best meets your needs:  Consider your health. Do you have diabetes? Problems with circulation? An over-the-counter insert may not be your best bet. Diabetes and poor circulation increase your risk of foot ulcers and infections, so schedule an appointment with a podiatrist. He or she can help you select a solution that won't cause additional  health problems.   Think about the purpose. Are you planning to run a marathon, or do you just need a little arch support in your work shoes? Look for a product that fits your planned level of activity.   Bring your shoes. For the insert to be effective, it has to fit into your shoes. So bring your sneakers, dress shoes, or work boots--whatever you plan to wear with your insert. Look for an insert that will fit the contours of your shoe.   Try them on. If all possible, slip the insert into your shoe and try it out. Walk around a little. How does it feel? Don't assume that feelings of pressure will go away with continued wear. (If you can't try the inserts at the store, ask about the store's return policy and hold on to your receipt.)    Please call one of the [place] locations below to schedule an appointment. If you received a prescription please bring it with you to your appointment. Some locations are limited to what they carry.    Office Locations    [facility]  [address] [place] [address]  Home Medical Equipment, [address]   Phone: [phone]   Orthotics and Prosthetics, [address]   Phone: [phone]    [facility]  [address]  [address]   [place] [address]   Phone: [phone]    [facility] Professional [facility]ld[facility]  [address]  [place] [address]  Phone: [phone]    [facility]ld[facility]   [address]  [place] [address]  Phone: [phone]    [facility]  [address]  [place] [address]  Phone: [phone]    [facility]  [address]  [place] [address]  Phone: [phone]    [place]   [address] [place] [address]   Phone: [phone]    WEARING YOUR CUSTOM FOOT ORTHOTICS   Most  insurance plans cover one pair of orthotics per year. You must check with your   insurance plan to see what your payment responsibility will be. Please call your   insurance company by calling the number on the back of your insurance card.   Orthotic's are non-refundable and non-returnable.   Orthotics are made of various designs. Some orthotics are covered with material that extends beyond your toes. If your orthotic is of this design, you will likely need to trim the toe end to get a proper fit. The insole from your shoe can be used as a template. Simply overlay the shoe insert on top of the custom orthotic. Align the heel end while tracing the length of the insert onto the custom orthotic. Use a large scissor to trim the toe end until you get a proper fit in the shoe.   The orthotic needs to be pushed as far back in the shoe as possible. The heel portion should not ride forward so as not to irritate your heel.   Orthotics are designed to work with socks. Excessive perspiration will shorten the life span of the orthotics. Remove the orthotic from the shoe frequently for proper drying.   The break-in period lasts for weeks. People new to orthotics will likely experience new aches and pains. The orthotic is forcing your foot into a new position. Arch, foot and leg muscle aches and fatigue are common during these weeks. Minor discomfort can be considered normal break in phenomenon. Start wearing your orthotic around your home your first day. Limited activity for one to two hours is recommended. You can increase one or two additional hours each day provided the aches and pains are subsiding. The degree of discomfort, fatigue and problems will dictate the speed of break in. You may require multiple weeks to work up to full time use.   Do not continue wearing your orthotics if they are creating problems such as blisters or sores. Do not hesitate to call the clinic to speak with a nurse regarding orthotic   break in,  fit, trimming, etc. You may also need to see the doctor if the orthotics are   simply not working out. Adjustments are sometimes made to improve orthotic   function.     Orthotics will only work in certain styles and types of shoes. Orthotics rarely work in dress shoes. Slip-ons, clogs, sandals and heels are particularly troublesome. Specially designed orthotics may be necessary for these types of shoes. Your custom orthotic was designed for activities that require appropriate walking or running shoes. Lace up athletic shoes, walking shoes or work boots should work appropriately. You may need a wider or longer shoe. Shoes with a removable  or insert work best. In general, you want to remove an insert from the shoe before placing the orthotic into the shoe. Shoes without a removable liner may not work as well.     When purchasing new shoes, bring your orthotics along to get a proper fit. Shop at stores that are familiar with orthotics.   Frequent washing of the orthotic may shorten the life span of the top cover. The top cover can be replaced but will generally last one to five years depending on use and foot perspiration.

## 2022-10-06 NOTE — PROGRESS NOTES
FOOT AND ANKLE SURGERY/PODIATRY Progress Note        ASSESSMENT:   Plantarflexed second metatarsal right foot  Metatarsal head right foot        TREATMENT: I have recommended orthotics with a metatarsal raise.  The patient is to return to clinic as needed.     HPI: Walter Capellan return to the clinic complain of pain on the ball of her right foot.  The patient stated that she has had this pain for several weeks.  It is a sharp pain which is aggravated by weightbearing and ambulation.  The pain is near the second metatarsal phalangeal joint.  She denies trauma to the right foot.  She has not had any associated redness or swelling.  The pain is relieved with nonweightbearing.      No past medical history on file.     Past Surgical History:   Procedure Laterality Date      SECTION         No Known Allergies      Current Outpatient Medications:      hydrocortisone, Perianal, (ANUSOL-HC) 2.5 % cream, Apply to rectum as needed., Disp: 30 g, Rfl: 0     ibuprofen (ADVIL/MOTRIN) 600 MG tablet, Take 1 tablet (600 mg) by mouth every 8 hours as needed for moderate pain, Disp: 42 tablet, Rfl: 0     Multiple Vitamins-Minerals (MULTIVITAMIN WOMEN PO), , Disp: , Rfl:      Vitamin D3 (CHOLECALCIFEROL) 25 mcg (1000 units) tablet, Take 1 tablet by mouth daily, Disp: , Rfl:     Family History   Problem Relation Age of Onset     No Known Problems Daughter        Social History     Socioeconomic History     Marital status:      Spouse name: Not on file     Number of children: Not on file     Years of education: Not on file     Highest education level: Not on file   Occupational History     Not on file   Tobacco Use     Smoking status: Never Smoker     Smokeless tobacco: Never Used   Substance and Sexual Activity     Alcohol use: Never     Drug use: Never     Sexual activity: Not on file   Other Topics Concern     Not on file   Social History Narrative     Not on file     Social Determinants of Health     Financial  Resource Strain: Not on file   Food Insecurity: Not on file   Transportation Needs: Not on file   Physical Activity: Not on file   Stress: Not on file   Social Connections: Not on file   Intimate Partner Violence: Not on file   Housing Stability: Not on file       10 point Review of Systems is negative      There were no vitals taken for this visit.    BMI= There is no height or weight on file to calculate BMI.    OBJECTIVE:  General appearance: Patient is alert and fully cooperative with history & exam.  No sign of distress is noted during the visit.  Vascular: Dorsalis pedis and posterior tibial pulses are palpable. There is no pedal hair growth bilaterally.  CFT < 3 sec from anterior tibial surface to distal digits bilaterally. There is no appreciable edema noted.  Dermatologic: Turgor and texture are within normal limits. No coloration or temperature changes. No primary or secondary lesions noted.  Neurologic: All epicritic and proprioceptive sensations are grossly intact bilaterally.  Negative Mark sign third intermetatarsal space right foot.  Musculoskeletal: All active and passive ankle, subtalar, midtarsal, and 1st MPJ range of motion are grossly intact without pain or crepitus, with the exception of none. Manual muscle strength is within normal limits bilaterally. All dorsiflexors, plantarflexors, invertors, evertors are intact bilaterally. Tenderness present to the second metatarsal phalangeal joint right foot on palpation. Tenderness to the second metatarsal phalangeal joint right foot with range of motion. Calf is soft/non-tender without warmth/induration       Imaging:         No results found.         Enrrique Ward; DPM  [facility] Foot & Ankle Surgery/Podiatry

## 2022-10-14 ENCOUNTER — OFFICE VISIT (OUTPATIENT)
Dept: FAMILY MEDICINE | Facility: CLINIC | Age: 32
End: 2022-10-14
Payer: COMMERCIAL

## 2022-10-14 VITALS
DIASTOLIC BLOOD PRESSURE: 80 MMHG | BODY MASS INDEX: 31.09 KG/M2 | SYSTOLIC BLOOD PRESSURE: 132 MMHG | TEMPERATURE: 98.5 F | HEIGHT: 66 IN | HEART RATE: 84 BPM | RESPIRATION RATE: 16 BRPM | WEIGHT: 193.44 LBS

## 2022-10-14 DIAGNOSIS — M25.512 CHRONIC LEFT SHOULDER PAIN: Primary | ICD-10-CM

## 2022-10-14 DIAGNOSIS — G89.29 CHRONIC LEFT SHOULDER PAIN: Primary | ICD-10-CM

## 2022-10-14 DIAGNOSIS — B18.1 CHRONIC VIRAL HEPATITIS B WITHOUT DELTA AGENT AND WITHOUT COMA (H): ICD-10-CM

## 2022-10-14 DIAGNOSIS — M54.2 CERVICAL PAIN (NECK): ICD-10-CM

## 2022-10-14 PROCEDURE — 99214 OFFICE O/P EST MOD 30 MIN: CPT | Performed by: FAMILY MEDICINE

## 2022-10-14 RX ORDER — NAPROXEN 500 MG/1
500 TABLET ORAL 2 TIMES DAILY WITH MEALS
Qty: 30 TABLET | Refills: 0 | Status: SHIPPED | OUTPATIENT
Start: 2022-10-14 | End: 2023-09-21

## 2022-10-14 ASSESSMENT — ENCOUNTER SYMPTOMS: CONSTITUTIONAL NEGATIVE: 1

## 2022-10-14 NOTE — PROGRESS NOTES
"  Problem List Items Addressed This Visit     Chronic left shoulder pain - Primary     Neck and back pain.  Unclear relation to radiation into the left face.  Normal head and neck exam.  Normal shoulder exam.  I suspect that this is a muscular injury and recommend a conservative approach.  Scheduled anti-inflammatories.  Referral for physical therapy.  She had a low back x-ray in May without abnormalities.  If not improving, consider referral to spine care (orthopedics).         Relevant Medications    naproxen (NAPROSYN) 500 MG tablet    Other Relevant Orders    Physical Therapy Referral    Chronic viral hepatitis B without delta agent and without coma (H)     Anicteric.         Other Visit Diagnoses     Cervical pain (neck)        Relevant Medications    naproxen (NAPROSYN) 500 MG tablet    Other Relevant Orders    Physical Therapy Referral          Julien Mauro is a 32 year old who presents for the following health issues   Chief Complaint   Patient presents with     Follow Up     Neck pain and LT eye.      Neck pain:   - low back and neck pain.    - some associated eye pain   - she was seen in the ED about a month ago.  She was    - she has applied heat.   - she has taken ibuprofen without much improvement.   - face and eye: \"Something moving in my face.\"     - onset: started with a fall while pregnant.   - she has a 10 month old. She holds her children in both arms.     - she thinks this is getting worse   - low back pain does not go down into her legs.  no saddle anesthesia, weakness, no urinary incontinence   - no changes in vision   - work: [employer].  She states that she does not lift heavy objects. She is assembling windows.         Review of Systems   Constitutional: Negative.    All other systems reviewed and are negative.           Objective    /80 (BP Location: Left arm, Patient Position: Sitting, Cuff Size: Adult Large)   Pulse 84   Temp 98.5  F (36.9  C) (Oral)   Resp 16   Ht " "1.676 m (5' 6\")   Wt 87.7 kg (193 lb 7 oz)   BMI 31.22 kg/m    Body mass index is 31.22 kg/m .  Physical Exam  Nursing note reviewed.   Constitutional:       General: She is not in acute distress.     Appearance: Normal appearance. She is not ill-appearing.   HENT:      Head: Normocephalic and atraumatic.   Eyes:      Extraocular Movements: Extraocular movements intact.      Conjunctiva/sclera: Conjunctivae normal.   Pulmonary:      Effort: Pulmonary effort is normal.   Musculoskeletal:      Comments: Shoulders are held symmetrically.  The muscular bulk seems to be balanced.  She has mild tenderness to palpation of the musculature of the upper thoracic back at the level of T2-T4 on the left side.  Midline is nontender.  She has tenderness of the muscles of the cervical neck and the insert into the occiput.    Shoulders: Negative Real.  Negative Neer's.  Normal active range of motion.  Strength 5/5.  Radial pulses present bilaterally.  Negative empty can testing.   Neurological:      Mental Status: She is alert and oriented to person, place, and time.   Psychiatric:         Attention and Perception: Attention normal.         Mood and Affect: Mood normal.         Speech: Speech normal.         Thought Content: Thought content normal.                This note has been dictated using voice recognition software. Any grammatical or context distortions are unintentional and inherent to the software      "

## 2022-10-19 ENCOUNTER — HOSPITAL ENCOUNTER (OUTPATIENT)
Dept: PHYSICAL THERAPY | Facility: REHABILITATION | Age: 32
Discharge: HOME | End: 2022-10-19
Attending: FAMILY MEDICINE
Payer: COMMERCIAL

## 2022-10-19 DIAGNOSIS — G89.29 CHRONIC LEFT SHOULDER PAIN: ICD-10-CM

## 2022-10-19 DIAGNOSIS — M25.512 CHRONIC LEFT SHOULDER PAIN: ICD-10-CM

## 2022-10-19 DIAGNOSIS — M54.2 CERVICAL PAIN (NECK): ICD-10-CM

## 2022-10-19 PROCEDURE — 97162 PT EVAL MOD COMPLEX 30 MIN: CPT | Mod: GP

## 2022-10-19 PROCEDURE — 97110 THERAPEUTIC EXERCISES: CPT | Mod: GP

## 2022-10-19 NOTE — PROGRESS NOTES
[facility]    OUTPATIENT PHYSICAL THERAPY ORTHOPEDIC EVALUATION  PLAN OF TREATMENT FOR OUTPATIENT REHABILITATION  (COMPLETE FOR INITIAL CLAIMS ONLY)  Patient's Last Name, First Name, M.I.  YOB: 1990  Nj Capellan    Provider s Name:  [facility]   Medical Record No.  [id number]   Start of Care Date:  10/19/22   Onset Date:  10/14/22   Type:     _X__PT   ___OT   ___SLP Medical Diagnosis:  Chronic left shoulder pain  Cervical pain (neck)     PT Diagnosis:  Mid back, neck, and left > right shoulder pain with decreased pain-free mobility   Visits from SOC:  1      _________________________________________________________________________________  Plan of Treatment/Functional Goals:  manual therapy, neuromuscular re-education, ROM, strengthening, stretching     Cryotherapy, Hot packs     Goals  Goal Identifier: (P) SPADI -  Short Term  Goal Description: (P) Nj will demonstrate improved function as evidenced by an improved (decreased) SPADI score of less than 90%.  Target Date: (P) 11/16/22    Goal Identifier: (P) SPADI - Long Term  Goal Description: (P) Nj will demonstrate improved function as evidenced by an improved (decreased) SPADI score of less than 50%.  Target Date: (P) 12/28/22    Goal Identifier: (P) Work  Goal Description: (P) Nj will be able to complete a full shift of work with self-report pain no higher than 3/10.  Target Date: (P) 12/28/22    Goal Identifier: (P) HEP  Goal Description: (P) Nj will demonstrate mastery of and compliance with home exercise program.  Target Date: (P) 12/28/22                                                Therapy Frequency:  1 time/week  Predicted Duration of Therapy Intervention:  8-10 weeks (about 8 visits total)    Red Ayoub, PT                 I CERTIFY THE NEED FOR THESE SERVICES FURNISHED UNDER      "   THIS PLAN OF TREATMENT AND WHILE UNDER MY CARE     (Physician co-signature of this document indicates review and certification of the therapy plan).                     Certification Date From:  10/19/22   Certification Date To:  12/18/22    Referring Provider:  Dr. Kurtis Rich MD    Initial Assessment        See Epic Evaluation Start of Care Date: 10/19/22                                                                                     10/19/22 1300   General Information   Type of Visit Initial OP Ortho PT Evaluation   Start of Care Date 10/19/22   Referring Physician Dr. Kurtis Rich MD   Patient/Family Goals Statement Feel better   Orders Evaluate and Treat   Date of Order 10/14/22   Certification Required? Yes   Medical Diagnosis Chronic left shoulder pain  Cervical pain (neck)   Surgical/Medical history reviewed Yes   Body Part(s)   Body Part(s) Cervical Spine;Shoulder   Presentation and Etiology   Pertinent history of current problem (include personal factors and/or comorbidities that impact the POC) Nj said that she fell about a year ago while she was pregnant, and she started to get right knee pain which she went to physical therapy for. She said that her mid back started to hurt not long later. Her mid back pain slowly spread to her upper back, neck, and both shoulders. She also reports left shoulder/neck pain that radiates into the left side of her face and her left eye. She denies any headaches. 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 She also reports occasional left chest pain (\"my heart is shaky\") when her shoulders are especially tight and painful. She also reports intermittent numbness and pain down her left medial arm an into her thumb.   Impairments A. Pain;D. Decreased ROM;E. Decreased flexibility;F. Decreased strength and endurance;K. Numbness;L. Tingling   Functional Limitations perform desired leisure / sports " activities;perform required work activities;perform activities of daily living   Symptom Location See history   How/Where did it occur   (See history)   Onset date of current episode/exacerbation 10/14/22   Chronicity Chronic   Pain rating (0-10 point scale) Worst (/10);Best (/10);Other   Best (/10) 6/10   Worst (/10) 10/10   Pain rating comment 8/10   Pain quality A. Sharp   Frequency of pain/symptoms A. Constant   Pain/symptoms are: Worse during the night   Pain/symptoms exacerbated by H. Overhead reach;C. Lifting;D. Carrying   Pain/symptoms eased by I. OTC medication(s);K. Other;C. Rest;E. Changing positions   Pain eased by comment Exercises   Progression of symptoms since onset: Worsened   Fall Risk Screen   Fall screen completed by PT   Have you fallen 2 or more times in the past year? Yes   Have you fallen and had an injury in the past year? Yes   Is patient a fall risk? No   Fall screen comments See history   Abuse Screen (yes response referral indicated)   Feels Unsafe at Home or Work/School no   Feels Threatened by Someone no   Does Anyone Try to Keep You From Having Contact with Others or Doing Things Outside Your Home? no   Physical Signs of Abuse Present no   Patient needs abuse support services and resources No   System Outcome Measures   Outcome Measures   (SPADI: 100%)   Cervical Spine   Cervical Left Side Bending ROM WNL   Cervical Right Rotation ROM WNL (left neck pain)   Cervical Left Rotation ROM WNL   Cervical Flexion ROM WNL (bilateral neck pain)   Cervical Extension ROM WNL (left neck pain)   Cervical Right Side Bending ROM WNL (left neck pain)   Thoracic Right Rotation 25% to 50%   Thoracic Left Rotation 25% to 50% (left neck and upper trap pain)   Shoulder AROM Screen Bilateral flexion and abduction: WNL (left shoulder pain with flexion)   Shoulder Shrug (C2-C4) Strength Bilateral: 5/5   Shoulder Abd (C5) Strength Bilateral: 5/5   Shoulder ER (C5, C6) Strength Bilateral: 5/5   Shoulder IR  (C5, C6) Strength Bilateral: 5/5   Elbow Flexion (C5, C6) Strength Bilateral: 5/5   Elbow Extension (C7) Strength Bilateral: 5/5   Wrist Extension (C6) Strength Bilateral: 5/5   Thumb Abd (C8) Strength Bilateral: 5/5   5th Finger Add (T1) Strength Bilateral: 5/5   Spurling Test Negative bilateral   Cervical Distraction Test Negative bilateral   Palpation Muscle tension with some tenderness noted throughout bilateral thoracic and cervical paraspinals, upper traps, and left > right periscapular musculature.   Dermatome/Sensory Testing Bilateral upper extremity dermatomes intact to light touch   UE Neural Tension UE Neural Tension Tests   Observation Right scapular winging   Posture Forward head and rounded shoulders   ULTT I (Median and Anterior Interosseous) Positive  (on left)   ULTT IV (Ulnar) Negative   Shoulder Objective Findings   Side (if bilateral, select both right and left) Left   Neer's Test Positive   Real-Benji Test Negative   Shoulder Special Tests Comments Empty can and speeds: positive.   Planned Therapy Interventions   Planned Therapy Interventions manual therapy;neuromuscular re-education;ROM;strengthening;stretching   Planned Modality Interventions   Planned Modality Interventions Cryotherapy;Hot packs   Clinical Impression   Criteria for Skilled Therapeutic Interventions Met yes, treatment indicated   PT Diagnosis Mid back, neck, and left > right shoulder pain with decreased pain-free mobility   Influenced by the following impairments Pain, poor posture, muscle tension, decreased pain-free ROM   Functional limitations due to impairments Lift, carry, reach overhead, and other ADLs and work tasks   Clinical Presentation Unstable/Unpredictable   Clinical Decision Making (Complexity) Moderate complexity   Therapy Frequency 1 time/week   Predicted Duration of Therapy Intervention (days/wks) 8-10 weeks (about 8 visits total)   Risk & Benefits of therapy have been explained Yes   Patient, Family &  "other staff in agreement with plan of care Yes   Clinical Impression Comments Nj is a 32-year-old female who presents to physical therapys with mid back, neck, and left > right shoulder and arm pain. She also reports occasional left face and eye pain, as well as intermittent numbness and tingling in the left > right arm/hand. She said that sometimes when her shoulders and neck are especially painful, she feels her \"heart is shaky,\" though it doesn't usually last long. She also tests positive with left shoulder impingement tests, though this could be to general neck and shoulder pain and irritation and not a true positive. She does test positive with median nerve tension testing as well. PT asked if the patient had discussed her heart symptoms with her referring provider, and she said she hadn't because she didn't want the possibility of \"having to worry about getting referred to a heart specialist.\" PT strongly recommended the patient follow-up with referring or primary care provider given subjective symptoms, thought the patient was unclear on whether she would or not. She will benefit from skilled therapy for mid back, neck, and shoulder/scapular ROM, flexibility and stabilization exercises as she follows up with her primary care provider regarding subjective symptoms. PT will also reach out to referring provider regarding symptoms and findings.   Ortho Goal 1   Goal Identifier SPADI -  Short Term   Goal Description Nj will demonstrate improved function as evidenced by an improved (decreased) SPADI score of less than 90%.   Goal Progress 100%   Target Date 11/16/22   Ortho Goal 2   Goal Identifier SPADI - Long Term   Goal Description Nj will demonstrate improved function as evidenced by an improved (decreased) SPADI score of less than 50%.   Goal Progress 100%   Target Date 12/28/22   Ortho Goal 3   Goal Identifier Work   Goal Description Nj will be able to complete a full shift of work with " self-report pain no higher than 3/10.   Goal Progress Unable   Target Date 12/28/22   Ortho Goal 4   Goal Identifier HEP   Goal Description Nj will demonstrate mastery of and compliance with home exercise program.   Goal Progress In progress   Target Date 12/28/22   Total Evaluation Time   PT Eval, Moderate Complexity Minutes (00900) 30   Therapy Certification   Certification date from 10/19/22   Certification date to 12/18/22   Medical Diagnosis Chronic left shoulder pain  Cervical pain (neck)

## 2022-10-27 ENCOUNTER — TELEPHONE (OUTPATIENT)
Dept: FAMILY MEDICINE | Facility: CLINIC | Age: 32
End: 2022-10-27

## 2022-10-27 NOTE — TELEPHONE ENCOUNTER
Left message to call back for: Patient  Information to relay to patient: See provider note and help patient schedule.

## 2022-10-27 NOTE — CONFIDENTIAL NOTE
"      Physical therapist took additional history.  The patient is experienced some type of \"shaking heart\" phenomena.  Please call patient and offer follow-up appointment.    Please clarify who this patient considers her primary care provider.  There is a Taylor Orantes listed although no clinic or credential.  I believe this is a nonmedical name that somehow got inserted into the EHR.  She seen Dr. Mane more than any other provider.    I think she could see Dr. Mane, myself or any provider if she is concerned about the chest discomfort phenomena.  "

## 2022-12-15 NOTE — ADDENDUM NOTE
Encounter addended by: Red yAoub, PT on: 12/15/2022 10:54 AM   Actions taken: Clinical Note Signed, Episode resolved

## 2022-12-15 NOTE — PROGRESS NOTES
"                                                                           [facility]    Outpatient Physical Therapy Discharge Note  Patient: Nj Capellan  : 1990    Beginning/End Dates of Reporting Period:  10/19/22 to 10/19/22    Referring Provider: Dr. Kurtis Rich MD    Therapy Diagnosis: Mid back, neck, and left > right shoulder pain with decreased pain-free mobility     Client Self Report: Nj said that she fell about a year ago while she was pregnant, and she started to get right knee pain which she went to physical therapy for. She said that her mid back started to hurt not long later. Her mid back pain slowly spread to her upper back, neck, and both shoulders. She also reports left shoulder/neck pain that radiates into the left side of her face and her left eye. She denies any headaches. 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 She also reports occasional left chest pain (\"my heart is shaky\") when her shoulders are especially tight and painful. She also reports intermittent numbness and pain down her left medial arm an into her thumb.    Objective Measurements:  Objective Measure: Worst Pain  Details: 10/10  Objective Measure: SPADI  Details: 100%  Objective Measure: Thoracic Rotation  Details: 25% to 50% (painful to left)  Objective Measure: Cervical Rotation and Side-bending AROM  Details: Right: WNL (painful). Left: WNL.      Outcome Measures (most recent score):  SPADI: 100%    Goals:  Goal Identifier SPADI -  Short Term   Goal Description Nj will demonstrate improved function as evidenced by an improved (decreased) SPADI score of less than 90%.   Target Date 22   Date Met      Progress (detail required for progress note): 100%     Goal Identifier SPADI - Long Term   Goal Description Nj will demonstrate improved function as evidenced by an improved (decreased) SPADI score of less than 50%. "   Target Date 12/28/22   Date Met      Progress (detail required for progress note): 100%     Goal Identifier Work   Goal Description Nj will be able to complete a full shift of work with self-report pain no higher than 3/10.   Target Date 12/28/22   Date Met      Progress (detail required for progress note): Unable     Goal Identifier HEP   Goal Description Nj will demonstrate mastery of and compliance with home exercise program.   Target Date 12/28/22   Date Met      Progress (detail required for progress note): In progress         Plan:  Discharge from therapy.    Discharge:    Reason for Discharge: Nj did not return for her only follow-up appointment and failed to schedule anymore visits.    Equipment Issued: NA    Discharge Plan: PT had recommended reaching back out to primary care provider given subjective report of possible hear-related symptoms, but patient did not make it clear whether she would or not. PT was unable to discuss formal discharge planning with the patient as the discharge was unplanned.

## 2023-01-18 ENCOUNTER — THERAPY VISIT (OUTPATIENT)
Dept: PHYSICAL THERAPY | Facility: CLINIC | Age: 33
End: 2023-01-18
Payer: COMMERCIAL

## 2023-01-18 DIAGNOSIS — M54.2 CERVICAL PAIN (NECK): ICD-10-CM

## 2023-01-18 DIAGNOSIS — M25.512 CHRONIC LEFT SHOULDER PAIN: Primary | ICD-10-CM

## 2023-01-18 DIAGNOSIS — G89.29 CHRONIC LEFT SHOULDER PAIN: Primary | ICD-10-CM

## 2023-01-18 PROCEDURE — 97112 NEUROMUSCULAR REEDUCATION: CPT | Mod: GP | Performed by: PHYSICAL THERAPIST

## 2023-01-18 PROCEDURE — 97161 PT EVAL LOW COMPLEX 20 MIN: CPT | Mod: GP | Performed by: PHYSICAL THERAPIST

## 2023-01-18 NOTE — PROGRESS NOTES
Physical Therapy Initial Evaluation  Subjective:  The history is provided by the patient.   Patient Health History  Nj Capellan being seen for L shoulder.     Problem began: 10/2/2022.   Problem occurred: Last year started having B shoulder and neck pain after a fall. Now having Neck/shoulder pain   Pain is reported as 10/10 on pain scale.  General health as reported by patient is good.   Other medical history details: cervical radiculopathy.                Current occupation is Not working currently.                     Therapist Generated HPI Evaluation         Type of problem:  Bilateral shoulders.    This is a chronic condition.  Condition occurred with:  A fall.  Where condition occurred: in the community.  Patient reports pain:  Upper arm and scapular area (neck).  Pain is described as aching and is intermittent.  Pain radiates to:  Shoulder and upper arm. Pain timing: throughout the day, juan in the evening.  Since onset symptoms are gradually worsening.  Associated symptoms:  Loss of motion/stiffness. Symptoms are exacerbated by lifting, using arm at shoulder level and using arm overhead  and relieved by heat and other (massage).  Special tests included:  X-ray.                            Objective:  Standing Alignment:    Cervical/Thoracic:  Forward head and thoracic kyphosis increased (poor sitting posture)  Shoulder/UE:  Rounded shoulders                                  Cervical/Thoracic Evaluation    AROM:  AROM Cervical:    Flexion:            Min - L side pain  Extension:       Protrusion, B shoulder pain, major -  Rotation:         Left: wnl- L side pain     Right: wnl - L side pain  Side Bend:      Left: wnl; contralateral stretch     Right:  Wnl; contralateral stretch  AROM Thoracic:    Flexion:             Extension:          Dec upper thoracic ext; limited OH flex and Abd  Rotation:            Left:     Right:      Headaches: cervical            Functional Tests:    Core strength and  proprioception:  Poor core strength, poor postural awareness, difficulty maintaining upright posture w/o cueing                                                General     ROS    Assessment/Plan:    Patient is a 32 year old female with bilateral shoulder complaints.    Patient has the following significant findings with corresponding treatment plan.                Diagnosis 1:  L > R shoulder pain, postural neck pain    Pain -  manual therapy, self management, education, directional preference exercise and home program  Decreased strength - therapeutic exercise, therapeutic activities and home program  Decreased proprioception - neuro re-education, therapeutic activities and home program  Impaired posture - neuro re-education, therapeutic activities and home program    Therapy Evaluation Codes:   Cumulative Therapy Evaluation is: Low complexity.    Previous and current functional limitations:  (See Goal Flow Sheet for this information)    Short term and Long term goals: (See Goal Flow Sheet for this information)     Communication ability:  Patient appears to be able to clearly communicate and understand verbal and written communication and follow directions correctly.  Treatment Explanation - The following has been discussed with the patient:   RX ordered/plan of care  Anticipated outcomes  Possible risks and side effects  This patient would benefit from PT intervention to resume normal activities.   Rehab potential is good.    Frequency:  1 X week, once daily  Duration:  for 4 weeks tapering to 2 X a month over 6 weeks  Discharge Plan:  Achieve all LTG.  Independent in home treatment program.  Reach maximal therapeutic benefit.    Please refer to the daily flowsheet for treatment today, total treatment time and time spent performing 1:1 timed codes.

## 2023-01-18 NOTE — PROGRESS NOTES
[facility]    OUTPATIENT Physical Therapy ORTHOPEDIC EVALUATION  PLAN OF TREATMENT FOR OUTPATIENT REHABILITATION  (COMPLETE FOR INITIAL CLAIMS ONLY)  Patient's Last Name, First Name, M.I.  YOB: 1990  Nj Capellan    Provider s Name:  [facility]   Medical Record No.  [id number]   Start of Care Date:  01/18/23   Onset Date:   10/02/22   Treatment Diagnosis:  L shoulder Medical Diagnosis:  Data Unavailable       Goals:     01/18/23 0500   Body Part   Goals listed below are for L shoulder   Goal #1   Goal #1 posture/body mechanics   Previous Functional Level Patient reports poor posture and proper body mechanics   Current Functional Level Poor posture/body mechanics   STG Target Performance Patient able to demonstrate good posture and body mechanics without prompting   Rationale to prevent neck/back pain and avoid injury when lifting/carrying and performing tasks requiring bending   Due Date 02/17/23   LTG Target Performance Improve patient awareness to maintain optimum posture the following percentage of time   Performance Level >50%   Rationale to prevent neck/back pain and avoid injury when lifting/carrying and performing tasks requiring bending   Due Date 04/17/23         Therapy Frequency:  weekly to bi-monthly  Predicted Duration of Therapy Intervention:  12 wks total    Sayra Godinez, PT                 I CERTIFY THE NEED FOR THESE SERVICES FURNISHED UNDER        THIS PLAN OF TREATMENT AND WHILE UNDER MY CARE     (Physician attestation of this document indicates review and certification of the therapy plan).                     Certification Date From:  01/18/23   Certification Date To:  04/17/23    Referring Provider:  Kurtis Rich    Initial Assessment        See Epic Evaluation SOC Date: 01/18/23

## 2023-01-20 NOTE — PROGRESS NOTES
Physical Therapy Initial Evaluation  Subjective:    Patient Health History                             Current occupation is None.                                       Objective:  System    Physical Exam    General     ROS    Assessment/Plan:

## 2023-01-24 PROBLEM — M54.2 CERVICAL PAIN (NECK): Status: ACTIVE | Noted: 2023-01-24

## 2023-01-26 ENCOUNTER — THERAPY VISIT (OUTPATIENT)
Dept: PHYSICAL THERAPY | Facility: CLINIC | Age: 33
End: 2023-01-26
Payer: COMMERCIAL

## 2023-01-26 DIAGNOSIS — G89.29 CHRONIC LEFT SHOULDER PAIN: Primary | ICD-10-CM

## 2023-01-26 DIAGNOSIS — M25.512 CHRONIC LEFT SHOULDER PAIN: Primary | ICD-10-CM

## 2023-01-26 DIAGNOSIS — M54.2 CERVICAL PAIN (NECK): ICD-10-CM

## 2023-01-26 PROCEDURE — 97140 MANUAL THERAPY 1/> REGIONS: CPT | Mod: GP

## 2023-01-26 PROCEDURE — 97112 NEUROMUSCULAR REEDUCATION: CPT | Mod: GP

## 2023-01-31 ENCOUNTER — THERAPY VISIT (OUTPATIENT)
Dept: PHYSICAL THERAPY | Facility: CLINIC | Age: 33
End: 2023-01-31
Payer: COMMERCIAL

## 2023-01-31 DIAGNOSIS — M54.2 CERVICAL PAIN (NECK): ICD-10-CM

## 2023-01-31 DIAGNOSIS — G89.29 CHRONIC LEFT SHOULDER PAIN: Primary | ICD-10-CM

## 2023-01-31 DIAGNOSIS — M25.512 CHRONIC LEFT SHOULDER PAIN: Primary | ICD-10-CM

## 2023-01-31 PROCEDURE — 97112 NEUROMUSCULAR REEDUCATION: CPT | Mod: GP | Performed by: PHYSICAL THERAPIST

## 2023-01-31 PROCEDURE — 97110 THERAPEUTIC EXERCISES: CPT | Mod: GP | Performed by: PHYSICAL THERAPIST

## 2023-08-06 ENCOUNTER — HEALTH MAINTENANCE LETTER (OUTPATIENT)
Age: 33
End: 2023-08-06

## 2023-08-08 ENCOUNTER — TELEPHONE (OUTPATIENT)
Dept: FAMILY MEDICINE | Facility: CLINIC | Age: 33
End: 2023-08-08
Payer: COMMERCIAL

## 2023-08-08 NOTE — TELEPHONE ENCOUNTER
Patient Quality Outreach    Patient is due for the following:       Topic Date Due    COVID-19 Vaccine (1) Never done    Pneumococcal Vaccine (1 - PCV) Never done    Hepatitis A Vaccine (1 of 2 - Risk 2-dose series) Never done       Next Steps:   Schedule a Adult Preventative    Type of outreach:    Chart review performed, no outreach needed.    Next Steps:  Reach out within 90 days via Letter.    Max number of attempts reached: Yes. Will try again in 90 days if patient still on fail list.    Questions for provider review:    None           Beatris Dubois MA  Chart routed to Care Team.

## 2023-08-10 ENCOUNTER — OFFICE VISIT (OUTPATIENT)
Dept: FAMILY MEDICINE | Facility: CLINIC | Age: 33
End: 2023-08-10
Payer: COMMERCIAL

## 2023-08-10 VITALS
TEMPERATURE: 97.9 F | BODY MASS INDEX: 31.65 KG/M2 | HEIGHT: 65 IN | OXYGEN SATURATION: 100 % | HEART RATE: 94 BPM | RESPIRATION RATE: 14 BRPM | DIASTOLIC BLOOD PRESSURE: 80 MMHG | WEIGHT: 190 LBS | SYSTOLIC BLOOD PRESSURE: 112 MMHG

## 2023-08-10 DIAGNOSIS — B37.31 CANDIDIASIS OF VAGINA: ICD-10-CM

## 2023-08-10 DIAGNOSIS — N89.8 VAGINAL DISCHARGE: ICD-10-CM

## 2023-08-10 DIAGNOSIS — Z00.00 ROUTINE GENERAL MEDICAL EXAMINATION AT A HEALTH CARE FACILITY: Primary | ICD-10-CM

## 2023-08-10 DIAGNOSIS — B18.1 CHRONIC VIRAL HEPATITIS B WITHOUT DELTA AGENT AND WITHOUT COMA (H): ICD-10-CM

## 2023-08-10 LAB
ALBUMIN SERPL BCG-MCNC: 4 G/DL (ref 3.5–5.2)
ALP SERPL-CCNC: 87 U/L (ref 35–104)
ALT SERPL W P-5'-P-CCNC: 12 U/L (ref 0–50)
ANION GAP SERPL CALCULATED.3IONS-SCNC: 9 MMOL/L (ref 7–15)
AST SERPL W P-5'-P-CCNC: 29 U/L (ref 0–45)
BILIRUB SERPL-MCNC: 0.5 MG/DL
BUN SERPL-MCNC: 10.2 MG/DL (ref 6–20)
CALCIUM SERPL-MCNC: 9 MG/DL (ref 8.6–10)
CHLORIDE SERPL-SCNC: 104 MMOL/L (ref 98–107)
CLUE CELLS: ABNORMAL
CREAT SERPL-MCNC: 0.66 MG/DL (ref 0.51–0.95)
DEPRECATED HCO3 PLAS-SCNC: 24 MMOL/L (ref 22–29)
ERYTHROCYTE [DISTWIDTH] IN BLOOD BY AUTOMATED COUNT: 17.4 % (ref 10–15)
GFR SERPL CREATININE-BSD FRML MDRD: >90 ML/MIN/1.73M2
GLUCOSE SERPL-MCNC: 98 MG/DL (ref 70–99)
HBV CORE AB SERPL QL IA: REACTIVE
HBV SURFACE AB SERPL IA-ACNC: 0 M[IU]/ML
HBV SURFACE AB SERPL IA-ACNC: NONREACTIVE M[IU]/ML
HCT VFR BLD AUTO: 40.3 % (ref 35–47)
HGB BLD-MCNC: 12.6 G/DL (ref 11.7–15.7)
MCH RBC QN AUTO: 24.9 PG (ref 26.5–33)
MCHC RBC AUTO-ENTMCNC: 31.3 G/DL (ref 31.5–36.5)
MCV RBC AUTO: 80 FL (ref 78–100)
PLATELET # BLD AUTO: 287 10E3/UL (ref 150–450)
POTASSIUM SERPL-SCNC: 4 MMOL/L (ref 3.4–5.3)
PROT SERPL-MCNC: 7.8 G/DL (ref 6.4–8.3)
RBC # BLD AUTO: 5.06 10E6/UL (ref 3.8–5.2)
SODIUM SERPL-SCNC: 137 MMOL/L (ref 136–145)
TRICHOMONAS, WET PREP: ABNORMAL
WBC # BLD AUTO: 5.3 10E3/UL (ref 4–11)
WBC'S/HIGH POWER FIELD, WET PREP: ABNORMAL
YEAST, WET PREP: PRESENT

## 2023-08-10 PROCEDURE — 99213 OFFICE O/P EST LOW 20 MIN: CPT | Mod: 25 | Performed by: FAMILY MEDICINE

## 2023-08-10 PROCEDURE — 86704 HEP B CORE ANTIBODY TOTAL: CPT | Performed by: FAMILY MEDICINE

## 2023-08-10 PROCEDURE — 36415 COLL VENOUS BLD VENIPUNCTURE: CPT | Performed by: FAMILY MEDICINE

## 2023-08-10 PROCEDURE — 87210 SMEAR WET MOUNT SALINE/INK: CPT | Performed by: FAMILY MEDICINE

## 2023-08-10 PROCEDURE — 90472 IMMUNIZATION ADMIN EACH ADD: CPT | Performed by: FAMILY MEDICINE

## 2023-08-10 PROCEDURE — 99395 PREV VISIT EST AGE 18-39: CPT | Mod: 25 | Performed by: FAMILY MEDICINE

## 2023-08-10 PROCEDURE — 90471 IMMUNIZATION ADMIN: CPT | Performed by: FAMILY MEDICINE

## 2023-08-10 PROCEDURE — 85027 COMPLETE CBC AUTOMATED: CPT | Performed by: FAMILY MEDICINE

## 2023-08-10 PROCEDURE — 90677 PCV20 VACCINE IM: CPT | Performed by: FAMILY MEDICINE

## 2023-08-10 PROCEDURE — 80053 COMPREHEN METABOLIC PANEL: CPT | Performed by: FAMILY MEDICINE

## 2023-08-10 PROCEDURE — 87517 HEPATITIS B DNA QUANT: CPT | Performed by: FAMILY MEDICINE

## 2023-08-10 PROCEDURE — 90632 HEPA VACCINE ADULT IM: CPT | Performed by: FAMILY MEDICINE

## 2023-08-10 PROCEDURE — 86706 HEP B SURFACE ANTIBODY: CPT | Performed by: FAMILY MEDICINE

## 2023-08-10 PROCEDURE — 86705 HEP B CORE ANTIBODY IGM: CPT | Performed by: FAMILY MEDICINE

## 2023-08-10 RX ORDER — CLOTRIMAZOLE 1 %
1 CREAM WITH APPLICATOR VAGINAL AT BEDTIME
Qty: 45 G | Refills: 0 | Status: SHIPPED | OUTPATIENT
Start: 2023-08-10 | End: 2024-06-25 | Stop reason: ALTCHOICE

## 2023-08-10 RX ORDER — FLUCONAZOLE 150 MG/1
TABLET ORAL
Qty: 2 TABLET | Refills: 0 | Status: SHIPPED | OUTPATIENT
Start: 2023-08-10 | End: 2023-09-21

## 2023-08-10 ASSESSMENT — ENCOUNTER SYMPTOMS
COUGH: 0
DIZZINESS: 0
CONSTIPATION: 0
ABDOMINAL PAIN: 0
FREQUENCY: 0
SHORTNESS OF BREATH: 0
DIARRHEA: 0
CHILLS: 0
PALPITATIONS: 0
ARTHRALGIAS: 0
FEVER: 0
SORE THROAT: 0
HEMATURIA: 0
JOINT SWELLING: 0
BREAST MASS: 0
MYALGIAS: 1
HEMATOCHEZIA: 0
EYE PAIN: 0
DYSURIA: 0
NAUSEA: 0
PARESTHESIAS: 0
HEADACHES: 0
HEARTBURN: 0
NERVOUS/ANXIOUS: 0

## 2023-08-10 NOTE — PROGRESS NOTES
SUBJECTIVE:   CC: Nj is an 33 year old who presents for preventive health visit.       8/10/2023     7:52 AM   Additional Questions   Roomed by Jigna BLANCAS CMA       Healthy Habits:     Getting at least 3 servings of Calcium per day:  NO    Bi-annual eye exam:  NO    Dental care twice a year:  NO    Sleep apnea or symptoms of sleep apnea:  None    Diet:  Regular (no restrictions)    Frequency of exercise:  1 day/week    Duration of exercise:  Less than 15 minutes    Taking medications regularly:  Yes    Additional concerns today:  No  From [facility]   Positive hep b core antibody in the past is positive .     Complaining of vaginal itching .           Social History     Tobacco Use    Smoking status: Never    Smokeless tobacco: Never   Substance Use Topics    Alcohol use: Never           8/10/2023     8:04 AM   Alcohol Use   Prescreen: >3 drinks/day or >7 drinks/week? No     Reviewed orders with patient.  Reviewed health maintenance and updated orders accordingly - Yes  Breast Cancer Screening:  Any new diagnosis of family breast, ovarian, or bowel cancer? No    FHS-7:        No data to display              click delete button to remove this line now  Pertinent mammograms are reviewed under the imaging tab.    History of abnormal Pap smear: NO - age 30-65 PAP every 5 years with negative HPV co-testing recommended     Reviewed and updated as needed this visit by clinical staff   Tobacco  Allergies  Meds              Reviewed and updated as needed this visit by Provider                 No past medical history on file.   Past Surgical History:   Procedure Laterality Date     SECTION         Review of Systems   Constitutional:  Negative for chills and fever.   HENT:  Negative for congestion, ear pain, hearing loss and sore throat.    Eyes:  Negative for pain and visual disturbance.   Respiratory:  Negative for cough and shortness of breath.    Cardiovascular:  Positive for peripheral edema. Negative for  "chest pain and palpitations.   Gastrointestinal:  Negative for abdominal pain, constipation, diarrhea, heartburn, hematochezia and nausea.   Breasts:  Negative for tenderness, breast mass and discharge.   Genitourinary:  Positive for vaginal discharge. Negative for dysuria, frequency, genital sores, hematuria, pelvic pain, urgency and vaginal bleeding.   Musculoskeletal:  Positive for myalgias. Negative for arthralgias and joint swelling.   Skin:  Negative for rash.   Neurological:  Negative for dizziness, headaches and paresthesias.   Psychiatric/Behavioral:  Negative for mood changes. The patient is not nervous/anxious.         OBJECTIVE:   /80   Pulse 94   Temp 97.9  F (36.6  C) (Oral)   Resp 14   Ht 1.657 m (5' 5.25\")   Wt 86.2 kg (190 lb)   LMP 07/27/2023 (Exact Date)   SpO2 100%   BMI 31.38 kg/m    Physical Exam  GENERAL: healthy, alert and no distress  EYES: Eyes grossly normal to inspection, PERRL and conjunctivae and sclerae normal  HENT: ear canals and TM's normal, nose and mouth without ulcers or lesions  NECK: no adenopathy, no asymmetry, masses, or scars and thyroid normal to palpation  RESP: lungs clear to auscultation - no rales, rhonchi or wheezes  BREAST: normal without masses, tenderness or nipple discharge and no palpable axillary masses or adenopathy  CV: regular rate and rhythm, normal S1 S2, no S3 or S4, no murmur, click or rub, no peripheral edema and peripheral pulses strong  ABDOMEN: soft, nontender, no hepatosplenomegaly, no masses and bowel sounds normal  MS: no gross musculoskeletal defects noted, no edema  SKIN: no suspicious lesions or rashes  NEURO: Normal strength and tone, mentation intact and speech normal  PSYCH: mentation appears normal, affect normal/bright    Diagnostic Test Results:  Labs reviewed in Epic    ASSESSMENT/PLAN:   (Z00.00) Routine general medical examination at a health care facility  (primary encounter diagnosis)  Comment:   Plan: Discussed healthy " "eating   Discussed maintaining ideal weight   Discussed regular exercise .   Discussed maintaining ideal weight     (N89.8) Vaginal discharge  Comment: Suggestive of candidiasis   Plan: Wet prep - Clinic Collect            (B18.1) Chronic viral hepatitis B without delta agent and without coma (H)  Comment:   Plan: Hep B Virus DNA Quant Real Time PCR, Hepatitis         B Surface Antibody, Comprehensive metabolic         panel (BMP + Alb, Alk Phos, ALT, AST, Total.         Bili, TP), CBC with platelets, Hepatitis B core        antibody        Will obtain viral load .    (B37.31) Candidiasis of vagina  Comment:   Plan: fluconazole (DIFLUCAN) 150 MG tablet,         clotrimazole (LOTRIMIN) 1 % vaginal cream                COUNSELING:  Reviewed preventive health counseling, as reflected in patient instructions       Regular exercise       Healthy diet/nutrition       Vision screening       Hearing screening      BMI:   Estimated body mass index is 31.38 kg/m  as calculated from the following:    Height as of this encounter: 1.657 m (5' 5.25\").    Weight as of this encounter: 86.2 kg (190 lb).   Weight management plan: Discussed healthy diet and exercise guidelines      She reports that she has never smoked. She has never used smokeless tobacco.          Emma Horan MD  [facility]  "

## 2023-08-11 LAB — HBV CORE IGM SERPL QL IA: NONREACTIVE

## 2023-08-12 LAB
HBV DNA SERPL NAA+PROBE-ACNC: 1410 IU/ML
HBV DNA SERPL NAA+PROBE-LOG IU: 3.1 {LOG_IU}/ML

## 2023-08-16 ENCOUNTER — TELEPHONE (OUTPATIENT)
Dept: FAMILY MEDICINE | Facility: CLINIC | Age: 33
End: 2023-08-16
Payer: COMMERCIAL

## 2023-08-16 DIAGNOSIS — B18.1 CHRONIC VIRAL HEPATITIS B WITHOUT DELTA AGENT AND WITHOUT COMA (H): Primary | ICD-10-CM

## 2023-08-16 NOTE — TELEPHONE ENCOUNTER
----- Message from Emma Horan MD sent at 8/16/2023 10:23 AM CDT -----  Team     I was unable to reach patient .  Please explain her Hepatitis B Test is positive as before indicative of chronic hepatitis B .  As level of viral load <2000 and liver enzymes normal .  It requires annual monitoring once a year .     Liver ultrasound scan is also recommended once a year .  I have ordered the screening ultrasound scan .  Blood count normal .  Patient has an option to see GI to discuss further chronic hepatitis b .     Thanks  Emma

## 2023-08-16 NOTE — TELEPHONE ENCOUNTER
Received incoming call from patient who was returning phone. Relayed lab results from Dr. Horan to patient. Patient states she wants to talk to Dr. Horan in person and was addiment about this writer scheduling her for an in person appointment. Assisted patient to scheduling in person appointment.    Appointments in Next Year      Sep 21, 2023  8:00 AM  (Arrive by 7:40 AM)  Provider Visit with Emma Horan MD  [facility] ([facility] ) [phone]          Routing to provider as FYI.    Thank you,  Yann Dey, Triage RN [facility]  10:39 AM 8/16/2023

## 2023-08-18 NOTE — TELEPHONE ENCOUNTER
Called and talked to patient. Reviewed Hep B results. She is interested in getting an Ultra Sound done. Will forward to PCP

## 2023-09-21 ENCOUNTER — OFFICE VISIT (OUTPATIENT)
Dept: FAMILY MEDICINE | Facility: CLINIC | Age: 33
End: 2023-09-21
Payer: COMMERCIAL

## 2023-09-21 VITALS
HEART RATE: 78 BPM | WEIGHT: 197 LBS | DIASTOLIC BLOOD PRESSURE: 82 MMHG | RESPIRATION RATE: 16 BRPM | OXYGEN SATURATION: 100 % | SYSTOLIC BLOOD PRESSURE: 118 MMHG | TEMPERATURE: 98.1 F | BODY MASS INDEX: 32.82 KG/M2 | HEIGHT: 65 IN

## 2023-09-21 DIAGNOSIS — B18.1 CHRONIC HEPATITIS B (H): Primary | ICD-10-CM

## 2023-09-21 DIAGNOSIS — B18.1 CHRONIC VIRAL HEPATITIS B WITHOUT DELTA AGENT AND WITHOUT COMA (H): Primary | ICD-10-CM

## 2023-09-21 DIAGNOSIS — R07.89 ATYPICAL CHEST PAIN: ICD-10-CM

## 2023-09-21 DIAGNOSIS — R94.31 NONSPECIFIC ST-T WAVE ELECTROCARDIOGRAPHIC CHANGES: ICD-10-CM

## 2023-09-21 DIAGNOSIS — H53.9 VISION CHANGES: ICD-10-CM

## 2023-09-21 PROCEDURE — 93000 ELECTROCARDIOGRAM COMPLETE: CPT | Performed by: FAMILY MEDICINE

## 2023-09-21 PROCEDURE — 99214 OFFICE O/P EST MOD 30 MIN: CPT | Mod: 25 | Performed by: FAMILY MEDICINE

## 2023-09-21 PROCEDURE — 90686 IIV4 VACC NO PRSV 0.5 ML IM: CPT | Performed by: FAMILY MEDICINE

## 2023-09-21 PROCEDURE — 90471 IMMUNIZATION ADMIN: CPT | Performed by: FAMILY MEDICINE

## 2023-09-21 ASSESSMENT — ENCOUNTER SYMPTOMS
DIFFICULTY URINATING: 0
ABDOMINAL PAIN: 0
AGITATION: 0
HEADACHES: 1
ABDOMINAL DISTENTION: 0

## 2023-09-21 NOTE — PROGRESS NOTES
"  Assessment & Plan     Chronic viral hepatitis B without delta agent and without coma (H)  Reviewed recent results .  Explained currently need monitoring .  As viral load count is low .  Patient would like to follow with the specialist .    - Adult Infectious Disease  Referral; Future  - US Abdomen Limited; Future    Atypical chest pain  Experiencing off and on chest discomfort .  Chest heaviness   - EKG 12-lead complete w/read - Clinics    Nonspecific ST-T wave electrocardiographic changes  - Exercise Stress Test - Adult; Future    Vision changes  - Adult Eye  Referral; Future      Emma Horan MD  [facility] is a 33 year old, presenting for the following health issues:  Follow Up (Hep B Dx.)  Complaining of pain in the shoulder .  Complaining of off and on chest discomfort .        Review of Systems   Cardiovascular:         Chest discomfort .   Gastrointestinal:  Negative for abdominal distention and abdominal pain.   Genitourinary:  Negative for difficulty urinating and dyspareunia.   Neurological:  Positive for headaches.   Psychiatric/Behavioral:  Negative for agitation and behavioral problems.             Objective    /82   Pulse 78   Temp 98.1  F (36.7  C) (Oral)   Resp 16   Ht 1.657 m (5' 5.25\")   Wt 89.4 kg (197 lb)   LMP 09/20/2023 (Exact Date)   SpO2 100%   BMI 32.53 kg/m    Body mass index is 32.53 kg/m .  Physical Exam  HENT:      Head: Normocephalic.      Nose: Nose normal.      Mouth/Throat:      Mouth: Mucous membranes are moist.   Cardiovascular:      Rate and Rhythm: Normal rate.   Pulmonary:      Effort: Pulmonary effort is normal.   Abdominal:      General: Abdomen is flat.   Musculoskeletal:         General: Normal range of motion.   Skin:     General: Skin is warm.   Neurological:      General: No focal deficit present.   Psychiatric:         Mood and Affect: Mood normal.                      "

## 2023-09-21 NOTE — LETTER
September 21, 2023  Re: Nj Capellan  YOB: 1990    Dear Colleague,    Thank you for your referral to the [facility] [place]. Unfortunately, ID does not see for Hepatitis diagnosis unless patient is HIV positive. Patient should be referred to Gastroenterology to see a Hepatologist.    If you have any questions or concerns, please contact our office at Dept: [phone].        Sincerely,  [facility] [place]

## 2024-02-01 ENCOUNTER — OFFICE VISIT (OUTPATIENT)
Dept: URGENT CARE | Facility: URGENT CARE | Age: 34
End: 2024-02-01
Payer: MEDICAID

## 2024-02-01 VITALS
TEMPERATURE: 98.4 F | OXYGEN SATURATION: 100 % | DIASTOLIC BLOOD PRESSURE: 90 MMHG | HEART RATE: 109 BPM | SYSTOLIC BLOOD PRESSURE: 131 MMHG

## 2024-02-01 DIAGNOSIS — J02.0 STREP THROAT: ICD-10-CM

## 2024-02-01 DIAGNOSIS — R07.0 THROAT PAIN: Primary | ICD-10-CM

## 2024-02-01 LAB — DEPRECATED S PYO AG THROAT QL EIA: POSITIVE

## 2024-02-01 PROCEDURE — 99213 OFFICE O/P EST LOW 20 MIN: CPT | Performed by: FAMILY MEDICINE

## 2024-02-01 PROCEDURE — 87880 STREP A ASSAY W/OPTIC: CPT | Performed by: FAMILY MEDICINE

## 2024-02-01 RX ORDER — FLUCONAZOLE 50 MG/1
TABLET ORAL
COMMUNITY
Start: 2023-08-10 | End: 2024-06-25 | Stop reason: ALTCHOICE

## 2024-02-01 RX ORDER — AMOXICILLIN 875 MG
875 TABLET ORAL 2 TIMES DAILY
Qty: 20 TABLET | Refills: 0 | Status: SHIPPED | OUTPATIENT
Start: 2024-02-01 | End: 2024-02-11

## 2024-02-01 NOTE — PROGRESS NOTES
ASSESSMENT/  PLAN:  Throat pain     - Streptococcus A Rapid Screen w/Reflex to PCR - Clinic Collect    Strep throat     - amoxicillin (AMOXIL) 875 MG tablet; Take 1 tablet (875 mg) by mouth 2 times daily for 10 days     Patient was counseled that to prevent spreading the strep infection that she should stay out of public places, work or school until she has completed 24 hours of antibiotic treatment     Symptomatic treat with gargles, lozenges, and OTC analgesic as needed. Follow-up with primary clinic if not improving.  ------------------------------------------------------------------------------------------------------------------------------------    SUBJECTIVE:  Chief Complaint   Patient presents with    Throat Pain     THROAT PAIN X2 DAYS  PAINFUL TO SWALLOW/SLEEP     Nj Capellan is a 33 year old female with a chief complaint of sore throat.  Onset of symptoms was 2 day(s) ago.    Course of illness: gradual onset, still present, worsening, and constant.  Severity moderate  Current and Associated symptoms: fever, chills, sore throat, headache, and fatigue  Treatment measures tried include Tylenol/Ibuprofen.  Predisposing factors include exposure to strep.    No past medical history on file.  Patient Active Problem List   Diagnosis    History of diverticulitis    Missed period    Encounter for triage in pregnant patient    Encounter for induction of labor    Chronic viral hepatitis B without delta agent and without coma (H)    Chronic left shoulder pain    Cervical pain (neck)         ALLERGIES:  Patient has no known allergies.    fluconazole (DIFLUCAN) 50 MG tablet,   ibuprofen (ADVIL/MOTRIN) 600 MG tablet, Take 1 tablet (600 mg) by mouth every 8 hours as needed for moderate pain  clotrimazole (LOTRIMIN) 1 % vaginal cream, Place 1 Applicatorful vaginally At Bedtime (Patient not taking: Reported on 2/1/2024)  Multiple Vitamins-Minerals (MULTIVITAMIN WOMEN PO),     No current facility-administered  medications on file prior to visit.      Social History     Tobacco Use    Smoking status: Never    Smokeless tobacco: Never   Substance Use Topics    Alcohol use: Never       Family History   Problem Relation Age of Onset    No Known Problems Daughter          ROS:  CONSTITUTIONAL:  fever, chills,   INTEGUMENTARY/SKIN: NEGATIVE for worrisome rashes,  or lesions  EYES: NEGATIVE for vision changes or irritation  RESP:NEGATIVE for significant cough or SOB    OBJECTIVE:   BP (!) 131/90   Pulse 109   Temp 98.4  F (36.9  C) (Oral)   SpO2 100%   GENERAL APPEARANCE: alert, moderate distress, and cooperative  EYES: EOMI,  PERRL, conjunctiva clear  HENT: ear canals and TM's normal.  Nose normal.  Pharynx erythematous with some exudate noted.  NECK: supple, non-tender to palpation, no adenopathy noted  RESP: lungs clear to auscultation - no rales, rhonchi or wheezes  CV: regular rates and rhythm, normal S1 S2, no murmur noted  SKIN: no suspicious lesions or rashes    Results for orders placed or performed in visit on 02/01/24   Streptococcus A Rapid Screen w/Reflex to PCR - Clinic Collect     Status: Abnormal    Specimen: Throat; Swab   Result Value Ref Range    Group A Strep antigen Positive (A) Negative

## 2024-02-12 ENCOUNTER — ALLIED HEALTH/NURSE VISIT (OUTPATIENT)
Dept: FAMILY MEDICINE | Facility: CLINIC | Age: 34
End: 2024-02-12
Payer: MEDICAID

## 2024-02-12 DIAGNOSIS — Z23 NEED FOR HEPATITIS A IMMUNIZATION: Primary | ICD-10-CM

## 2024-02-12 PROCEDURE — 90632 HEPA VACCINE ADULT IM: CPT

## 2024-02-12 PROCEDURE — 90471 IMMUNIZATION ADMIN: CPT

## 2024-02-12 PROCEDURE — 99207 PR NO CHARGE NURSE ONLY: CPT

## 2024-02-12 NOTE — PROGRESS NOTES
Prior to immunization administration, verified patients identity using patient s name and date of birth. Please see Immunization Activity for additional information.     Screening Questionnaire for Adult Immunization    Are you sick today?   No   Do you have allergies to medications, food, a vaccine component or latex?   No   Have you ever had a serious reaction after receiving a vaccination?   No   Do you have a long-term health problem with heart, lung, kidney, or metabolic disease (e.g., diabetes), asthma, a blood disorder, no spleen, complement component deficiency, a cochlear implant, or a spinal fluid leak?  Are you on long-term aspirin therapy?   No   Do you have cancer, leukemia, HIV/AIDS, or any other immune system problem?   No   Do you have a parent, brother, or sister with an immune system problem?   No   In the past 3 months, have you taken medications that affect  your immune system, such as prednisone, other steroids, or anticancer drugs; drugs for the treatment of rheumatoid arthritis, Crohn s disease, or psoriasis; or have you had radiation treatments?   No   Have you had a seizure, or a brain or other nervous system problem?   No   During the past year, have you received a transfusion of blood or blood    products, or been given immune (gamma) globulin or antiviral drug?   No   For women: Are you pregnant or is there a chance you could become       pregnant during the next month?   No   Have you received any vaccinations in the past 4 weeks?   No     Immunization questionnaire answers were all negative.    I have reviewed the following standing orders:   This patient is due and qualifies for the Hep A vaccine.    Click here for Hepatitis A Standing Order    I have reviewed the vaccines inclusion and exclusion criteria; No concerns regarding eligibility.     Patient instructed to remain in clinic for 15 minutes afterwards, and to report any adverse reactions.     Screening performed by Tess GALEANA  CAITLIN Sims on 2/12/2024 at 8:07 AM.

## 2024-03-21 PROBLEM — M54.2 CERVICAL PAIN (NECK): Status: RESOLVED | Noted: 2023-01-24 | Resolved: 2024-03-21

## 2024-03-21 PROBLEM — M25.512 CHRONIC LEFT SHOULDER PAIN: Status: RESOLVED | Noted: 2022-10-14 | Resolved: 2024-03-21

## 2024-03-21 PROBLEM — G89.29 CHRONIC LEFT SHOULDER PAIN: Status: RESOLVED | Noted: 2022-10-14 | Resolved: 2024-03-21

## 2024-06-25 ENCOUNTER — OFFICE VISIT (OUTPATIENT)
Dept: URGENT CARE | Facility: URGENT CARE | Age: 34
End: 2024-06-25
Payer: COMMERCIAL

## 2024-06-25 VITALS
OXYGEN SATURATION: 97 % | SYSTOLIC BLOOD PRESSURE: 122 MMHG | DIASTOLIC BLOOD PRESSURE: 88 MMHG | TEMPERATURE: 98 F | HEART RATE: 88 BPM

## 2024-06-25 DIAGNOSIS — B35.6 TINEA CRURIS: Primary | ICD-10-CM

## 2024-06-25 PROCEDURE — 99213 OFFICE O/P EST LOW 20 MIN: CPT | Performed by: PHYSICIAN ASSISTANT

## 2024-06-25 RX ORDER — FLUCONAZOLE 150 MG/1
150 TABLET ORAL
Qty: 3 TABLET | Refills: 0 | Status: SHIPPED | OUTPATIENT
Start: 2024-06-25 | End: 2024-07-02

## 2024-06-25 RX ORDER — MICONAZOLE NITRATE 20 MG/G
CREAM TOPICAL 2 TIMES DAILY
Qty: 60 G | Refills: 0 | Status: SHIPPED | OUTPATIENT
Start: 2024-06-25 | End: 2024-07-09

## 2024-06-25 NOTE — PROGRESS NOTES
Assessment & Plan     Tinea cruris  Miconazole cream is prescribed.  Diflucan also prescribed.  Keep affected area dry and clean. Advised to keep monitoring symptoms.  Follow-up if any worsening symptoms.  Patient agrees with the plan.  - miconazole (MICATIN) 2 % external cream  Dispense: 60 g; Refill: 0  - fluconazole (DIFLUCAN) 150 MG tablet  Dispense: 3 tablet; Refill: 0       Return in about 1 week (around 7/2/2024) for Symptoms failing to improve.    Nadine Juan PA-C  [facility] SHARDA Mauro is a 34 year old female who presents to clinic today for the following health issues:  Chief Complaint   Patient presents with    Urgent Care     Pt presents with  groin itching,been going on for a while, pt says she was seen and given some medications and cream (lotrimin) in the past, symptoms have restarted in the past 3 weeks       HPI    Patient is presenting to urgent care today with a complaint of itching in bilateral groin.  She reports ongoing symptoms for the past 3 weeks.  She reports similar symptoms several months ago, reports she was treated with oral and  vaginal antifungal cream.  She denies any vaginal itching, abnormal vaginal discharge, any dysuria or abdominal pain today.  Treatment tried prior to arrival: None. No fever. No v/d.      Review of Systems  Constitutional, HEENT, cardiovascular, pulmonary, GI, , musculoskeletal, neuro, skin, endocrine and psych systems are negative, except as otherwise noted.      Objective    /88   Pulse 88   Temp 98  F (36.7  C) (Tympanic)   SpO2 97%   Physical Exam   GENERAL: alert and no distress  RESP:  Normal breathing effort  SKIN: deferred at patient's request

## 2024-07-11 ENCOUNTER — PATIENT OUTREACH (OUTPATIENT)
Dept: CARE COORDINATION | Facility: CLINIC | Age: 34
End: 2024-07-11
Payer: COMMERCIAL

## 2024-07-25 ENCOUNTER — PATIENT OUTREACH (OUTPATIENT)
Dept: CARE COORDINATION | Facility: CLINIC | Age: 34
End: 2024-07-25
Payer: COMMERCIAL

## 2024-09-27 ENCOUNTER — OFFICE VISIT (OUTPATIENT)
Dept: FAMILY MEDICINE | Facility: CLINIC | Age: 34
End: 2024-09-27
Payer: COMMERCIAL

## 2024-09-27 VITALS
DIASTOLIC BLOOD PRESSURE: 83 MMHG | HEART RATE: 85 BPM | RESPIRATION RATE: 20 BRPM | WEIGHT: 200 LBS | HEIGHT: 65 IN | TEMPERATURE: 98 F | BODY MASS INDEX: 33.32 KG/M2 | OXYGEN SATURATION: 99 % | SYSTOLIC BLOOD PRESSURE: 116 MMHG

## 2024-09-27 DIAGNOSIS — B18.1 CHRONIC VIRAL HEPATITIS B WITHOUT DELTA AGENT AND WITHOUT COMA (H): ICD-10-CM

## 2024-09-27 DIAGNOSIS — Z13.0 SCREENING FOR DEFICIENCY ANEMIA: ICD-10-CM

## 2024-09-27 DIAGNOSIS — Z12.4 CERVICAL CANCER SCREENING: ICD-10-CM

## 2024-09-27 DIAGNOSIS — Z13.228 SCREENING FOR METABOLIC DISORDER: ICD-10-CM

## 2024-09-27 DIAGNOSIS — Z00.00 WELL ADULT EXAM: ICD-10-CM

## 2024-09-27 DIAGNOSIS — Z13.220 LIPID SCREENING: ICD-10-CM

## 2024-09-27 LAB
ERYTHROCYTE [DISTWIDTH] IN BLOOD BY AUTOMATED COUNT: 15.2 % (ref 10–15)
HCT VFR BLD AUTO: 41.8 % (ref 35–47)
HGB BLD-MCNC: 13.5 G/DL (ref 11.7–15.7)
MCH RBC QN AUTO: 27.3 PG (ref 26.5–33)
MCHC RBC AUTO-ENTMCNC: 32.3 G/DL (ref 31.5–36.5)
MCV RBC AUTO: 85 FL (ref 78–100)
PLATELET # BLD AUTO: 274 10E3/UL (ref 150–450)
RBC # BLD AUTO: 4.94 10E6/UL (ref 3.8–5.2)
WBC # BLD AUTO: 4.8 10E3/UL (ref 4–11)

## 2024-09-27 PROCEDURE — 87517 HEPATITIS B DNA QUANT: CPT | Performed by: FAMILY MEDICINE

## 2024-09-27 PROCEDURE — 80061 LIPID PANEL: CPT | Performed by: FAMILY MEDICINE

## 2024-09-27 PROCEDURE — 36415 COLL VENOUS BLD VENIPUNCTURE: CPT | Performed by: FAMILY MEDICINE

## 2024-09-27 PROCEDURE — 85027 COMPLETE CBC AUTOMATED: CPT | Performed by: FAMILY MEDICINE

## 2024-09-27 PROCEDURE — 99395 PREV VISIT EST AGE 18-39: CPT | Performed by: FAMILY MEDICINE

## 2024-09-27 PROCEDURE — 80053 COMPREHEN METABOLIC PANEL: CPT | Performed by: FAMILY MEDICINE

## 2024-09-27 PROCEDURE — 87624 HPV HI-RISK TYP POOLED RSLT: CPT | Performed by: FAMILY MEDICINE

## 2024-09-27 PROCEDURE — 99213 OFFICE O/P EST LOW 20 MIN: CPT | Mod: 25 | Performed by: FAMILY MEDICINE

## 2024-09-27 PROCEDURE — G0145 SCR C/V CYTO,THINLAYER,RESCR: HCPCS | Performed by: FAMILY MEDICINE

## 2024-09-27 ASSESSMENT — PAIN SCALES - GENERAL: PAINLEVEL: MODERATE PAIN (5)

## 2024-09-27 ASSESSMENT — SOCIAL DETERMINANTS OF HEALTH (SDOH): HOW OFTEN DO YOU GET TOGETHER WITH FRIENDS OR RELATIVES?: ONCE A WEEK

## 2024-09-27 NOTE — PROGRESS NOTES
"Preventive Care Visit  [facility]  Emma Horan MD, Family Medicine  Sep 27, 2024      Assessment & Plan     (Z00.00) Well adult exam  Comment: Discussed healthy eating   Discussed maintaining ideal weight   Discussed regular exercise       (Z12.4) Cervical cancer screening  Comment: pap obtained and send .  Plan: HPV and Gynecologic Cytology Panel -         Recommended Age 30 - 65 Years            (B18.1) Chronic viral hepatitis B without delta agent and without coma (H)  Comment: chronic hep b , will obtain lab , liver enzymes , viral load     Plan: Hep B Virus DNA Quant Real Time PCR, US Abdomen        Limited            (Z13.220) Lipid screening  Comment:   Plan: Lipid panel reflex to direct LDL Fasting          (Z13.0) Screening for deficiency anemia  Comment:   Plan: CBC with platelets            (Z13.228) Screening for metabolic disorder  Comment:   Plan: Comprehensive metabolic panel (BMP + Alb, Alk         Phos, ALT, AST, Total. Bili, TP)                BMI  Estimated body mass index is 33.16 kg/m  as calculated from the following:    Height as of this encounter: 1.654 m (5' 5.12\").    Weight as of this encounter: 90.7 kg (200 lb).   Weight management plan: Discussed healthy diet and exercise guidelines    Counseling  Appropriate preventive services were addressed with this patient via screening, questionnaire, or discussion as appropriate for fall prevention, nutrition, physical activity, Tobacco-use cessation, social engagement, weight loss and cognition.  Checklist reviewing preventive services available has been given to the patient.  Reviewed patient's diet, addressing concerns and/or questions.   She is at risk for lack of exercise and has been provided with information to increase physical activity for the benefit of her well-being.         Julien Mauro is a 34 year old, presenting for the following:  Physical (fasting)        9/27/2024    10:00 AM   Additional Questions "   Roomed by Lis   Accompanied by self        Health Care Directive  Patient does not have a Health Care Directive or Living Will: Discussed advance care planning with patient; information given to patient to review.    HPI          9/27/2024   General Health   How would you rate your overall physical health? Good   Feel stress (tense, anxious, or unable to sleep) Not at all            9/27/2024   Nutrition   Three or more servings of calcium each day? (!) NO   Diet: Regular (no restrictions)   How many servings of fruit and vegetables per day? (!) 0-1   How many sweetened beverages each day? 0-1            9/27/2024   Exercise   Days per week of moderate/strenous exercise 1 day   Average minutes spent exercising at this level 40 min      (!) EXERCISE CONCERN      9/27/2024   Social Factors   Frequency of gathering with friends or relatives Once a week   Worry food won't last until get money to buy more No   Food not last or not have enough money for food? No   Do you have housing? (Housing is defined as stable permanent housing and does not include staying ouside in a car, in a tent, in an abandoned building, in an overnight shelter, or couch-surfing.) No   Are you worried about losing your housing? No   Lack of transportation? No   Unable to get utilities (heat,electricity)? No   Want help with housing or utility concern? No      (!) HOUSING CONCERN PRESENT      9/27/2024   Dental   Dentist two times every year? Yes            9/27/2024   TB Screening   Were you born outside of the [place]? Yes            Today's PHQ-2 Score:       9/27/2024     9:59 AM   PHQ-2 ( 1999 [employer])   Q1: Little interest or pleasure in doing things 0   Q2: Feeling down, depressed or hopeless 0   PHQ-2 Score 0   Q1: Little interest or pleasure in doing things Not at all   Q2: Feeling down, depressed or hopeless Not at all   PHQ-2 Score 0           9/27/2024   Substance Use   Alcohol more than 3/day or more than 7/wk No   Do you use any  "other substances recreationally? No        Social History     Tobacco Use    Smoking status: Never     Passive exposure: Never    Smokeless tobacco: Never   Vaping Use    Vaping status: Never Used   Substance Use Topics    Alcohol use: Never    Drug use: Never          Mammogram Screening - Mammogram every 1-2 years updated in Health Maintenance based on mutual decision making        2024   STI Screening   New sexual partner(s) since last STI/HIV test? No        History of abnormal Pap smear: No - age 30-64 HPV with reflex Pap every 5 years recommended             2024   Contraception/Family Planning   Questions about contraception or family planning No           Reviewed and updated as needed this visit by Provider                    No past medical history on file.  Past Surgical History:   Procedure Laterality Date     SECTION           Review of Systems  CONSTITUTIONAL: NEGATIVE for fever, chills, change in weight  INTEGUMENTARY/SKIN: NEGATIVE for worrisome rashes, moles or lesions  EYES: NEGATIVE for vision changes or irritation  ENT/MOUTH: NEGATIVE for ear, mouth and throat problems  RESP: NEGATIVE for significant cough or SOB  BREAST: NEGATIVE for masses, tenderness or discharge  CV: NEGATIVE for chest pain, palpitations or peripheral edema  GI: NEGATIVE for nausea, abdominal pain, heartburn, or change in bowel habits  : NEGATIVE for frequency, dysuria, or hematuria  MUSCULOSKELETAL: NEGATIVE for significant arthralgias or myalgia  NEURO: NEGATIVE for weakness, dizziness or paresthesias  ENDOCRINE: NEGATIVE for temperature intolerance, skin/hair changes  HEME: NEGATIVE for bleeding problems  PSYCHIATRIC: NEGATIVE for changes in mood or affect     Objective    Exam  /83 (BP Location: Right arm, Patient Position: Sitting, Cuff Size: Adult Large)   Pulse 85   Temp 98  F (36.7  C) (Oral)   Resp 20   Ht 1.654 m (5' 5.12\")   Wt 90.7 kg (200 lb)   LMP 2024 (Approximate)   " "SpO2 99%   Breastfeeding No   BMI 33.16 kg/m     Estimated body mass index is 33.16 kg/m  as calculated from the following:    Height as of this encounter: 1.654 m (5' 5.12\").    Weight as of this encounter: 90.7 kg (200 lb).    Physical Exam  GENERAL: alert and no distress  EYES: Eyes grossly normal to inspection, PERRL and conjunctivae and sclerae normal  HENT: ear canals and TM's normal, nose and mouth without ulcers or lesions  NECK: no adenopathy, no asymmetry, masses, or scars  RESP: lungs clear to auscultation - no rales, rhonchi or wheezes  CV: regular rate and rhythm, normal S1 S2, no S3 or S4, no murmur, click or rub, no peripheral edema  ABDOMEN: soft, nontender, no hepatosplenomegaly, no masses and bowel sounds normal  MS: no gross musculoskeletal defects noted, no edema  SKIN: no suspicious lesions or rashes  NEURO: Normal strength and tone, mentation intact and speech normal  PSYCH: mentation appears normal, affect normal/bright        Signed Electronically by: Emma Horan MD    "

## 2024-09-28 LAB
ALBUMIN SERPL BCG-MCNC: 4.1 G/DL (ref 3.5–5.2)
ALP SERPL-CCNC: 111 U/L (ref 40–150)
ALT SERPL W P-5'-P-CCNC: 18 U/L (ref 0–50)
ANION GAP SERPL CALCULATED.3IONS-SCNC: 11 MMOL/L (ref 7–15)
AST SERPL W P-5'-P-CCNC: 23 U/L (ref 0–45)
BILIRUB SERPL-MCNC: 0.5 MG/DL
BUN SERPL-MCNC: 10.6 MG/DL (ref 6–20)
CALCIUM SERPL-MCNC: 9.2 MG/DL (ref 8.8–10.4)
CHLORIDE SERPL-SCNC: 103 MMOL/L (ref 98–107)
CHOLEST SERPL-MCNC: 151 MG/DL
CREAT SERPL-MCNC: 0.68 MG/DL (ref 0.51–0.95)
EGFRCR SERPLBLD CKD-EPI 2021: >90 ML/MIN/1.73M2
FASTING STATUS PATIENT QL REPORTED: YES
FASTING STATUS PATIENT QL REPORTED: YES
GLUCOSE SERPL-MCNC: 101 MG/DL (ref 70–99)
HCO3 SERPL-SCNC: 25 MMOL/L (ref 22–29)
HDLC SERPL-MCNC: 59 MG/DL
LDLC SERPL CALC-MCNC: 79 MG/DL
NONHDLC SERPL-MCNC: 92 MG/DL
POTASSIUM SERPL-SCNC: 4.1 MMOL/L (ref 3.4–5.3)
PROT SERPL-MCNC: 8.3 G/DL (ref 6.4–8.3)
SODIUM SERPL-SCNC: 139 MMOL/L (ref 135–145)
TRIGL SERPL-MCNC: 67 MG/DL

## 2024-09-30 ENCOUNTER — TELEPHONE (OUTPATIENT)
Dept: FAMILY MEDICINE | Facility: CLINIC | Age: 34
End: 2024-09-30
Payer: COMMERCIAL

## 2024-09-30 DIAGNOSIS — B18.1 CHRONIC VIRAL HEPATITIS B WITHOUT DELTA AGENT AND WITHOUT COMA (H): Primary | ICD-10-CM

## 2024-09-30 LAB
HBV DNA SERPL NAA+PROBE-ACNC: 4680 IU/ML
HBV DNA SERPL NAA+PROBE-LOG IU: 3.7 {LOG_IU}/ML
HPV HR 12 DNA CVX QL NAA+PROBE: NEGATIVE
HPV16 DNA CVX QL NAA+PROBE: NEGATIVE
HPV18 DNA CVX QL NAA+PROBE: NEGATIVE
HUMAN PAPILLOMA VIRUS FINAL DIAGNOSIS: NORMAL

## 2024-09-30 NOTE — TELEPHONE ENCOUNTER
Patient calls, is wanting help understanding her results for Hep B. Explained notes from Dr. Horan, that she needs to be seen by GI and that a referral was sent to [facility] for a consult and that [facility] would be calling them to set up an appointment. Patient agrees with plan, will call back with any other concerns.

## 2024-10-02 LAB
BKR AP ASSOCIATED HPV REPORT: NORMAL
BKR LAB AP GYN ADEQUACY: NORMAL
BKR LAB AP GYN INTERPRETATION: NORMAL
BKR LAB AP PREVIOUS ABNORMAL: NORMAL
PATH REPORT.COMMENTS IMP SPEC: NORMAL
PATH REPORT.COMMENTS IMP SPEC: NORMAL
PATH REPORT.RELEVANT HX SPEC: NORMAL

## 2024-10-04 ENCOUNTER — HOSPITAL ENCOUNTER (OUTPATIENT)
Dept: ULTRASOUND IMAGING | Facility: CLINIC | Age: 34
Discharge: HOME | End: 2024-10-04
Attending: FAMILY MEDICINE | Admitting: FAMILY MEDICINE
Payer: COMMERCIAL

## 2024-10-04 DIAGNOSIS — B18.1 CHRONIC VIRAL HEPATITIS B WITHOUT DELTA AGENT AND WITHOUT COMA (H): ICD-10-CM

## 2024-10-04 PROCEDURE — 76705 ECHO EXAM OF ABDOMEN: CPT
